# Patient Record
Sex: FEMALE | Race: WHITE | Employment: FULL TIME | URBAN - METROPOLITAN AREA
[De-identification: names, ages, dates, MRNs, and addresses within clinical notes are randomized per-mention and may not be internally consistent; named-entity substitution may affect disease eponyms.]

---

## 2017-01-04 ENCOUNTER — OFFICE VISIT (OUTPATIENT)
Dept: SURGERY | Age: 22
End: 2017-01-04

## 2017-01-04 DIAGNOSIS — K80.50 BILIARY COLIC: Primary | ICD-10-CM

## 2017-01-04 NOTE — PROGRESS NOTES
Progress Note    Patient: Josiah Black  MRN: [id number]  SSN: [id number]   YOB: 1995  Age: 22 y.o. Sex: female     No chief complaint on file. HPI    Ms. lopes is a most 2 weeks status post a open cholecystectomy. She had this because her liver was very large and would not allow visualization of the jessica. She has done well and is tolerating a regular diet moving her bowels without difficulty. Her drain is putting out around 30 cc of serous fluid and then went to go ahead and pull that today as it has been in a while. There is no bile in the drain. We will see her back in a couple weeks    Past Medical History   Diagnosis Date    Asthma      Past Surgical History   Procedure Laterality Date    Hx orthopaedic  7years old     186 stitches in arm (trauma), general anesthesia used    Pr removal gallbladder N/A 12/22/2016     Dr. Shelby Cheng     No Known Allergies  Current Outpatient Prescriptions   Medication Sig Dispense Refill    oxyCODONE-acetaminophen (PERCOCET 7.5) 7.5-325 mg per tablet Take 1 Tab by mouth every four (4) hours as needed for Pain. Max Daily Amount: 6 Tabs. 30 Tab 0    albuterol (PROVENTIL HFA, VENTOLIN HFA, PROAIR HFA) 90 mcg/actuation inhaler Take 2 Puffs by inhalation every four (4) hours as needed.  ondansetron (ZOFRAN ODT) 4 mg disintegrating tablet Take 1 Tab by mouth every eight (8) hours as needed for Nausea. 14 Tab 0     Social History     Social History    Marital status: SINGLE     Spouse name: N/A    Number of children: N/A    Years of education: N/A     Occupational History    Not on file. Social History Main Topics    Smoking status: Never Smoker    Smokeless tobacco: Never Used    Alcohol use No    Drug use: No    Sexual activity: Not on file     Other Topics Concern    Not on file     Social History Narrative     No family history on file.       Review of systems:  Patient denies any reflux, emesis, abdominal pain, change in bowel habits, hematochezia, melena, fever, weight loss, fatigue chills, dermatitis, abnormal moles, change in vision, vertigo, epistaxis, dysphagia, hoarseness, chest pain, palpitations, hypertension, edema, cough, shortness of breath, wheezing, hemoptysis, snoring, hematuria, diabetes, thyroid disease, anemia, bruising, history of blood transfusion, dizziness, headache, or fainting. Physical Examination    Well developed well nourished female in no apparent distress  Visit Vitals    LMP 11/14/2016      Head: normocephalic, atraumatic  Mouth: Clear, no overt lesions, oral mucosa pink and moist  Neck: supple, no masses, no adenopathy or carotid bruits, trachea midline  Resp: clear to auscultation bilaterally, no wheeze, rhonchi or rales, excursions normal and symmetrical  Cardio: Regular rate and rhythm, no murmurs, clicks, gallops or rubs, no edema or varicosities  Abdomen: soft, nontender, nondistended, normoactive bowel sounds, no hernias, no hepatosplenomegaly,   Back: Deferred  Extremeties: warm, well-perfused, no tenderness or swelling, normal gait/station  Neuro: sensation and strength grossly intact and symmetrical  Psych: alert and oriented to person, place and time  Breast exam deferred    IMPRESSION  Status post open cholecystectomy doing well and improving daily    PLAN  No orders of the defined types were placed in this encounter.     Follow-up in 2 weeks  Katja Napier MD

## 2017-01-04 NOTE — MR AVS SNAPSHOT
Visit Information Date & Time Provider Department Dept. Phone Encounter #  
 1/4/2017  3:15 PM Augustine Redd [facility] [phone] [id number] Your Appointments 1/18/2017  1:00 PM  
Follow Up with Chris Landa MD  
[address] Appt Note: f/u  
 [address] [facility] [address] [facility] [address]  
[phone]  
  
   
 [address] [facility] [address] [facility] [address] Upcoming Health Maintenance Date Due Hepatitis A Peds Age 1-18 (1 of 2 - Standard Series) 5/10/1997 DTaP/Tdap/Td series (1 - Tdap) 5/10/2003 HPV AGE 9Y-26Y (1 of 3 - Female 3 Dose Series) 5/10/2007 INFLUENZA AGE 9 TO ADULT 8/1/2016 Allergies as of 1/4/2017  Review Complete On: 1/4/2017 By: Chris Landa MD  
 No Known Allergies Current Immunizations  Never Reviewed No immunizations on file. Not reviewed this visit You Were Diagnosed With   
  
 Codes Comments Biliary colic    -  Primary LILY-09-XB: K80.50 ICD-9-CM: 574.20 Vitals LMP OB Status Smoking Status 11/14/2016 Having regular periods Never Smoker Your Updated Medication List  
  
   
This list is accurate as of: 1/4/17  3:48 PM.  Always use your most recent med list.  
  
  
  
  
 albuterol 90 mcg/actuation inhaler Commonly known as:  PROVENTIL HFA, VENTOLIN HFA, PROAIR HFA Take 2 Puffs by inhalation every four (4) hours as needed. ondansetron 4 mg disintegrating tablet Commonly known as:  ZOFRAN ODT Take 1 Tab by mouth every eight (8) hours as needed for Nausea. oxyCODONE-acetaminophen 7.5-325 mg per tablet Commonly known as:  PERCOCET 7.5 Take 1 Tab by mouth every four (4) hours as needed for Pain. Max Daily Amount: 6 Tabs. Please provide this summary of care documentation to your next provider. Your primary care clinician is listed as Prabhakar Abraham. If you have any questions after today's visit, please call [phone].

## 2017-01-18 ENCOUNTER — OFFICE VISIT (OUTPATIENT)
Dept: SURGERY | Age: 22
End: 2017-01-18

## 2017-01-18 VITALS — RESPIRATION RATE: 22 BRPM | HEIGHT: 64 IN | BODY MASS INDEX: 48.49 KG/M2 | HEART RATE: 88 BPM | WEIGHT: 284 LBS

## 2017-01-18 DIAGNOSIS — K80.50 BILIARY COLIC: Primary | ICD-10-CM

## 2017-01-18 NOTE — MR AVS SNAPSHOT
Visit Information Date & Time Provider Department Dept. Phone Encounter #  
 1/18/2017  1:00 PM Augustine Burns [facility] [id number] Upcoming Health Maintenance Date Due Hepatitis A Peds Age 1-18 (1 of 2 - Standard Series) 5/10/1997 DTaP/Tdap/Td series (1 - Tdap) 5/10/2003 HPV AGE 9Y-26Y (1 of 3 - Female 3 Dose Series) 5/10/2007 INFLUENZA AGE 9 TO ADULT 8/1/2016 Allergies as of 1/18/2017  Review Complete On: 1/18/2017 By: Angelica Rice LPN No Known Allergies Current Immunizations  Never Reviewed No immunizations on file. Not reviewed this visit You Were Diagnosed With   
  
 Codes Comments Biliary colic    -  Primary YMY-73-ZC: K80.50 ICD-9-CM: 574.20 Vitals Pulse Resp Height(growth percentile) Weight(growth percentile) LMP BMI  
 88 22 5' 4\" (1.626 m) 284 lb (128.8 kg) 11/14/2016 48.75 kg/m2 OB Status Smoking Status Having regular periods Never Smoker BMI and BSA Data Body Mass Index Body Surface Area 48.75 kg/m 2 2.41 m 2 Your Updated Medication List  
  
   
This list is accurate as of: 1/18/17  1:32 PM.  Always use your most recent med list.  
  
  
  
  
 albuterol 90 mcg/actuation inhaler Commonly known as:  PROVENTIL HFA, VENTOLIN HFA, PROAIR HFA Take 2 Puffs by inhalation every four (4) hours as needed. ondansetron 4 mg disintegrating tablet Commonly known as:  ZOFRAN ODT Take 1 Tab by mouth every eight (8) hours as needed for Nausea. oxyCODONE-acetaminophen 7.5-325 mg per tablet Commonly known as:  PERCOCET 7.5 Take 1 Tab by mouth every four (4) hours as needed for Pain. Max Daily Amount: 6 Tabs. Patient Instructions Call the office at [phone] if you have any questions or concerns. Please provide this summary of care documentation to your next provider. Your primary care clinician is listed as Prabhakar Cat. If you have any questions after today's visit, please call [phone].

## 2017-01-18 NOTE — PROGRESS NOTES
Progress Note    Patient: Samina Chris  MRN: [id number]  SSN: [id number]   YOB: 1995  Age: 22 y.o. Sex: female     Chief Complaint   Patient presents with    Post OP Follow Up       HPI    Ms. Agnes Ryan returns one last time to do a wound check and see how she is doing. She has no complaints, is tolerating a regular diet and moving her bowels normally. Her wound looks good, and she has no complaints we will see her as needed from now on. Past Medical History   Diagnosis Date    Asthma      Past Surgical History   Procedure Laterality Date    Hx orthopaedic  7years old     80 stitches in arm (trauma), general anesthesia used    Pr removal gallbladder N/A 12/22/2016     Dr. Howie Oconnell     No Known Allergies  Current Outpatient Prescriptions   Medication Sig Dispense Refill    oxyCODONE-acetaminophen (PERCOCET 7.5) 7.5-325 mg per tablet Take 1 Tab by mouth every four (4) hours as needed for Pain. Max Daily Amount: 6 Tabs. 30 Tab 0    albuterol (PROVENTIL HFA, VENTOLIN HFA, PROAIR HFA) 90 mcg/actuation inhaler Take 2 Puffs by inhalation every four (4) hours as needed.  ondansetron (ZOFRAN ODT) 4 mg disintegrating tablet Take 1 Tab by mouth every eight (8) hours as needed for Nausea. 14 Tab 0     Social History     Social History    Marital status: SINGLE     Spouse name: N/A    Number of children: N/A    Years of education: N/A     Occupational History    Not on file. Social History Main Topics    Smoking status: Never Smoker    Smokeless tobacco: Never Used    Alcohol use No    Drug use: No    Sexual activity: Not on file     Other Topics Concern    Not on file     Social History Narrative     No family history on file.       Review of systems:  Patient denies any reflux, emesis, abdominal pain, change in bowel habits, hematochezia, melena, fever, weight loss, fatigue chills, dermatitis, abnormal moles, change in vision, vertigo, epistaxis, dysphagia, hoarseness, chest pain, palpitations, hypertension, edema, cough, shortness of breath, wheezing, hemoptysis, snoring, hematuria, diabetes, thyroid disease, anemia, bruising, history of blood transfusion, dizziness, headache, or fainting. Physical Examination    Well developed well nourished female in no apparent distress  Visit Vitals    Pulse 88    Resp 22    Ht 5' 4\" (1.626 m)    Wt 128.8 kg (284 lb)    LMP 11/14/2016    BMI 48.75 kg/m2      Head: normocephalic, atraumatic  Mouth: Clear, no overt lesions, oral mucosa pink and moist  Neck: supple, no masses, no adenopathy or carotid bruits, trachea midline  Resp: clear to auscultation bilaterally, no wheeze, rhonchi or rales, excursions normal and symmetrical  Cardio: Regular rate and rhythm, no murmurs, clicks, gallops or rubs, no edema or varicosities  Abdomen: soft, nontender, nondistended, normoactive bowel sounds, no hernias, no hepatosplenomegaly, healed laparotomy wound  Back: Deferred  Extremeties: warm, well-perfused, no tenderness or swelling, normal gait/station  Neuro: sensation and strength grossly intact and symmetrical  Psych: alert and oriented to person, place and time  Breast exam deferred    IMPRESSION  Doing well after open cholecystectomy    PLAN  No orders of the defined types were placed in this encounter.     Follow-up as needed  Jean Paul Butcher MD

## 2017-11-07 ENCOUNTER — HOSPITAL ENCOUNTER (EMERGENCY)
Age: 22
Discharge: HOME | End: 2017-11-07
Attending: EMERGENCY MEDICINE
Payer: COMMERCIAL

## 2017-11-07 VITALS
WEIGHT: 287.4 LBS | OXYGEN SATURATION: 100 % | HEART RATE: 98 BPM | DIASTOLIC BLOOD PRESSURE: 77 MMHG | TEMPERATURE: 98.6 F | RESPIRATION RATE: 20 BRPM | BODY MASS INDEX: 49.06 KG/M2 | SYSTOLIC BLOOD PRESSURE: 130 MMHG | HEIGHT: 64 IN

## 2017-11-07 DIAGNOSIS — N39.0 URINARY TRACT INFECTION WITH HEMATURIA, SITE UNSPECIFIED: Primary | ICD-10-CM

## 2017-11-07 DIAGNOSIS — N93.9 ABNORMAL UTERINE BLEEDING: ICD-10-CM

## 2017-11-07 DIAGNOSIS — R31.9 URINARY TRACT INFECTION WITH HEMATURIA, SITE UNSPECIFIED: Primary | ICD-10-CM

## 2017-11-07 LAB
ALBUMIN SERPL-MCNC: 3.7 G/DL (ref 3.4–5)
ALBUMIN/GLOB SERPL: 1 {RATIO} (ref 0.8–1.7)
ALP SERPL-CCNC: 86 U/L (ref 45–117)
ALT SERPL-CCNC: 49 U/L (ref 13–56)
ANION GAP SERPL CALC-SCNC: 8 MMOL/L (ref 3–18)
APPEARANCE UR: ABNORMAL
AST SERPL-CCNC: 34 U/L (ref 15–37)
BACTERIA URNS QL MICRO: ABNORMAL /HPF
BASOPHILS # BLD: 0 K/UL (ref 0–0.1)
BASOPHILS NFR BLD: 0 % (ref 0–2)
BILIRUB SERPL-MCNC: 0.3 MG/DL (ref 0.2–1)
BILIRUB UR QL: ABNORMAL
BUN SERPL-MCNC: 11 MG/DL (ref 7–18)
BUN/CREAT SERPL: 17 (ref 12–20)
CALCIUM SERPL-MCNC: 8.6 MG/DL (ref 8.5–10.1)
CHLORIDE SERPL-SCNC: 105 MMOL/L (ref 100–108)
CO2 SERPL-SCNC: 27 MMOL/L (ref 21–32)
COLOR UR: ABNORMAL
CREAT SERPL-MCNC: 0.65 MG/DL (ref 0.6–1.3)
DIFFERENTIAL METHOD BLD: ABNORMAL
EOSINOPHIL # BLD: 0.2 K/UL (ref 0–0.4)
EOSINOPHIL NFR BLD: 2 % (ref 0–5)
EPITH CASTS URNS QL MICRO: ABNORMAL /LPF (ref 0–5)
ERYTHROCYTE [DISTWIDTH] IN BLOOD BY AUTOMATED COUNT: 14.2 % (ref 11.6–14.5)
GLOBULIN SER CALC-MCNC: 3.7 G/DL (ref 2–4)
GLUCOSE SERPL-MCNC: 109 MG/DL (ref 74–99)
GLUCOSE UR STRIP.AUTO-MCNC: NEGATIVE MG/DL
HCG UR QL: NEGATIVE
HCT VFR BLD AUTO: 36.1 % (ref 35–45)
HGB BLD-MCNC: 11.4 G/DL (ref 12–16)
HGB UR QL STRIP: ABNORMAL
KETONES UR QL STRIP.AUTO: NEGATIVE MG/DL
LEUKOCYTE ESTERASE UR QL STRIP.AUTO: ABNORMAL
LYMPHOCYTES # BLD: 2.8 K/UL (ref 0.9–3.6)
LYMPHOCYTES NFR BLD: 37 % (ref 21–52)
MCH RBC QN AUTO: 26.2 PG (ref 24–34)
MCHC RBC AUTO-ENTMCNC: 31.6 G/DL (ref 31–37)
MCV RBC AUTO: 83 FL (ref 74–97)
MONOCYTES # BLD: 0.5 K/UL (ref 0.05–1.2)
MONOCYTES NFR BLD: 7 % (ref 3–10)
NEUTS SEG # BLD: 4 K/UL (ref 1.8–8)
NEUTS SEG NFR BLD: 54 % (ref 40–73)
NITRITE UR QL STRIP.AUTO: POSITIVE
PH UR STRIP: 6.5 [PH] (ref 5–8)
PLATELET # BLD AUTO: 249 K/UL (ref 135–420)
PMV BLD AUTO: 12 FL (ref 9.2–11.8)
POTASSIUM SERPL-SCNC: 4.4 MMOL/L (ref 3.5–5.5)
PROT SERPL-MCNC: 7.4 G/DL (ref 6.4–8.2)
PROT UR STRIP-MCNC: 100 MG/DL
RBC # BLD AUTO: 4.35 M/UL (ref 4.2–5.3)
RBC #/AREA URNS HPF: ABNORMAL /HPF (ref 0–5)
SODIUM SERPL-SCNC: 140 MMOL/L (ref 136–145)
SP GR UR REFRACTOMETRY: 1.02 (ref 1–1.03)
UROBILINOGEN UR QL STRIP.AUTO: 0.2 EU/DL (ref 0.2–1)
WBC # BLD AUTO: 7.4 K/UL (ref 4.6–13.2)
WBC URNS QL MICRO: ABNORMAL /HPF (ref 0–4)

## 2017-11-07 PROCEDURE — 87086 URINE CULTURE/COLONY COUNT: CPT | Performed by: PHYSICIAN ASSISTANT

## 2017-11-07 PROCEDURE — 81025 URINE PREGNANCY TEST: CPT | Performed by: PHYSICIAN ASSISTANT

## 2017-11-07 PROCEDURE — 99284 EMERGENCY DEPT VISIT MOD MDM: CPT

## 2017-11-07 PROCEDURE — 81001 URINALYSIS AUTO W/SCOPE: CPT | Performed by: PHYSICIAN ASSISTANT

## 2017-11-07 PROCEDURE — 80053 COMPREHEN METABOLIC PANEL: CPT | Performed by: PHYSICIAN ASSISTANT

## 2017-11-07 PROCEDURE — 85025 COMPLETE CBC W/AUTO DIFF WBC: CPT | Performed by: PHYSICIAN ASSISTANT

## 2017-11-07 RX ORDER — SULFAMETHOXAZOLE AND TRIMETHOPRIM 800; 160 MG/1; MG/1
1 TABLET ORAL 2 TIMES DAILY
Qty: 14 TAB | Refills: 0 | Status: SHIPPED | OUTPATIENT
Start: 2017-11-07 | End: 2017-11-14

## 2017-11-07 NOTE — ED PROVIDER NOTES
HPI Comments: Pt is a 26 yo female with PMH of irregular menses and asthma presents to the ED for heavy vaginal bleeding and cramping. Pt reports she did not have menses last month, began it this month on 11/2/17. She has been having much heavier bleeding and PCP told her to come to ED. She notes associated pelvic cramping, but that has been happening for 3-4 weeks. She also notes increased urinary frequency. Pt denies fever, chills, HA, dizziness, lightheadedness, CP, SOB, cough, abd pain, back pain, numbness, weakness, N/V/D/C, dysuria, or any other complaints. Patient is a 25 y.o. female presenting with vaginal bleeding. The history is provided by the patient. Vaginal Bleeding   Pertinent negatives include no chest pain, no abdominal pain, no headaches and no shortness of breath. Nothing relieves the symptoms. She has tried nothing for the symptoms. The treatment provided no relief. Past Medical History:   Diagnosis Date    Asthma        Past Surgical History:   Procedure Laterality Date    HX ORTHOPAEDIC  7years old    80 stitches in arm (trauma), general anesthesia used    REMOVAL GALLBLADDER N/A 12/22/2016    Dr. Howie Oconnell         No family history on file. Social History     Social History    Marital status: SINGLE     Spouse name: N/A    Number of children: N/A    Years of education: N/A     Occupational History    Not on file. Social History Main Topics    Smoking status: Never Smoker    Smokeless tobacco: Never Used    Alcohol use No    Drug use: No    Sexual activity: Not on file     Other Topics Concern    Not on file     Social History Narrative         ALLERGIES: Review of patient's allergies indicates no known allergies. Review of Systems   Constitutional: Negative for chills and fever. HENT: Negative for ear pain, rhinorrhea and sore throat. Eyes: Negative for pain and redness. Respiratory: Negative for cough and shortness of breath.     Cardiovascular: Negative for chest pain. Gastrointestinal: Negative for abdominal pain, anal bleeding, blood in stool, constipation, diarrhea, nausea, rectal pain and vomiting. Genitourinary: Positive for frequency, menstrual problem, pelvic pain and vaginal bleeding. Negative for dysuria, vaginal discharge and vaginal pain. Musculoskeletal: Negative for back pain and gait problem. Skin: Negative. Neurological: Negative for dizziness, weakness, light-headedness and headaches. Hematological: Does not bruise/bleed easily. Psychiatric/Behavioral: Negative. Vitals:    11/07/17 1327   BP: 130/77   Pulse: 98   Resp: 20   Temp: 98.6 °F (37 °C)   SpO2: 100%   Weight: 130.4 kg (287 lb 6.4 oz)   Height: 5' 4\" (1.626 m)            Physical Exam   Constitutional: She is oriented to person, place, and time. She appears well-developed and well-nourished. No distress. Obese   HENT:   Head: Normocephalic and atraumatic. Right Ear: Tympanic membrane, external ear and ear canal normal.   Left Ear: Tympanic membrane, external ear and ear canal normal.   Nose: Nose normal.   Mouth/Throat: Oropharynx is clear and moist and mucous membranes are normal.   Eyes: Conjunctivae and EOM are normal. Pupils are equal, round, and reactive to light. Neck: Normal range of motion. Neck supple. Cardiovascular: Normal rate, regular rhythm, normal heart sounds and intact distal pulses. Exam reveals no gallop and no friction rub. No murmur heard. Pulmonary/Chest: Effort normal and breath sounds normal. No respiratory distress. She has no wheezes. She has no rales. Abdominal: Soft. Bowel sounds are normal. She exhibits no distension. There is no tenderness. There is no rigidity, no rebound, no guarding, no CVA tenderness, no tenderness at McBurney's point and negative Amato's sign. Genitourinary: No vaginal discharge found. Genitourinary Comments: Vaginal bleeding noted in vault. Musculoskeletal: Normal range of motion. Neurological: She is alert and oriented to person, place, and time. Skin: Skin is warm and dry. She is not diaphoretic. Psychiatric: She has a normal mood and affect. Her behavior is normal. Judgment and thought content normal.   Nursing note and vitals reviewed. MDM  Number of Diagnoses or Management Options  Urinary tract infection with hematuria, site unspecified:   Diagnosis management comments: DDx: preg, ectopic, miscarriage, menstruation, ovarian cyst, ovarian torsion, tubo-ovarian abscess, fibroids, UTI, pyelo, kidney stones, STD,  Cdwi-Qmfj-Dqfdor syndrome, hemorroids, dehydration,anemia, malignancy    Briefed PILAR Barry about the patient. He/She will follow any all lab reports and imaging studies. He/She will monitor the patient closely and dispo accordingly. Anupama Albarran PA-C     6:30   Assumed care of patient from [facility]. Will examine patient, perform pelvic exam and review all labs/work up done thus far. Gayatri Sarmiento PA-C     Pt denies being sexually active, or hx of. Reports that she has seen an obgyn in past but not currently. Pelvic exam with moderate bleeding, NAP. No indication for specimens. Will check cbc and cmp. Ua for infection. Plan to d/c home with obgyn f/u      7:53 PM  UA consistent with UTI. Will treat with short course of abx and culture urine. Labs otherwise WNL. H&H stable. Normal WBC, lytes.      obgyn f/u and return sx discussed       Amount and/or Complexity of Data Reviewed  Clinical lab tests: reviewed and ordered  Review and summarize past medical records: yes  Discuss the patient with other providers: yes  Independent visualization of images, tracings, or specimens: yes    Risk of Complications, Morbidity, and/or Mortality  Presenting problems: moderate  Diagnostic procedures: moderate  Management options: moderate    Patient Progress  Patient progress: stable    ED Course       Pelvic Exam  Date/Time: 11/7/2017 6:34 PM  Performed by: PA  Procedure duration:  10 minutes. Documented by:  self. As dictated by:  self  Exam assisted by:  ED nurse. Type of exam performed: speculum. External genitalia appearance: normal.    Vaginal exam:  normal and bleeding. Bleeding: moderate  Cervical exam:  normal and inadequately visualized. Specimen(s) collected:  none. Bimanual exam:  normal.    Patient tolerance: Patient tolerated the procedure well with no immediate complications          Labs Reviewed   CBC WITH AUTOMATED DIFF - Abnormal; Notable for the following:        Result Value    HGB 11.4 (*)     MPV 12.0 (*)     All other components within normal limits   WET PREP   URINALYSIS W/ RFLX MICROSCOPIC   HCG URINE, QL   CHLAMYDIA/NEISSERIA AMPLIFICATION   METABOLIC PANEL, COMPREHENSIVE     Diagnosis: No diagnosis found. Disposition: Home    Follow-up Information     None          Patient's Medications   Start Taking    No medications on file   Continue Taking    ALBUTEROL (PROVENTIL HFA, VENTOLIN HFA, PROAIR HFA) 90 MCG/ACTUATION INHALER    Take 2 Puffs by inhalation every four (4) hours as needed. ONDANSETRON (ZOFRAN ODT) 4 MG DISINTEGRATING TABLET    Take 1 Tab by mouth every eight (8) hours as needed for Nausea. OXYCODONE-ACETAMINOPHEN (PERCOCET 7.5) 7.5-325 MG PER TABLET    Take 1 Tab by mouth every four (4) hours as needed for Pain. Max Daily Amount: 6 Tabs.    These Medications have changed    No medications on file   Stop Taking    No medications on file

## 2017-11-07 NOTE — LETTER
[facility] EMERGENCY DEPT 
[address] [facility] [address] 
[phone] Work/School Note Date: 11/7/2017 To Whom It May concern: 
 
Frandy Gamez was seen and treated today in the emergency room by the following provider(s): 
Attending Provider: Agustin Lewis DO Physician Assistant: Madhuri Simpson PA-C. Frandy Gamez return to work 11/9/17 Sincerely, Madhuri Simpson PA-C

## 2017-11-08 LAB
BACTERIA SPEC CULT: NORMAL
SERVICE CMNT-IMP: NORMAL

## 2017-11-08 NOTE — DISCHARGE INSTRUCTIONS
Blood in the Urine: Care Instructions  Your Care Instructions    Blood in the urine, or hematuria, may make the urine look red, brown, or pink. There may be blood every time you urinate or just from time to time. You cannot always see blood in the urine, but it will show up in a urine test.  Blood in the urine may be serious. It should always be checked by a doctor. Your doctor may recommend more tests, including an X-ray, a CT scan, or a cystoscopy (which lets a doctor look inside the urethra and bladder). Blood in the urine can be a sign of another problem. Common causes are bladder infections and kidney stones. An injury to your groin or your genital area can also cause bleeding in the urinary tract. Very hard exercise-such as running a marathon-can cause blood in the urine. Blood in the urine can also be a sign of kidney disease or cancer in the bladder or kidney. Many cases of blood in the urine are caused by a harmless condition that runs in families. This is called benign familial hematuria. It does not need any treatment. Sometimes your urine may look red or brown even though it does not contain blood. For example, not getting enough fluids (dehydration), taking certain medicines, or having a liver problem can change the color of your urine. Eating foods such as beets, rhubarb, or blackberries or foods with red food coloring can make your urine look red or pink. Follow-up care is a key part of your treatment and safety. Be sure to make and go to all appointments, and call your doctor if you are having problems. It's also a good idea to know your test results and keep a list of the medicines you take. When should you call for help? Call your doctor now or seek immediate medical care if:  · You have symptoms of a urinary infection. For example:  ¨ You have pus in your urine. ¨ You have pain in your back just below your rib cage. This is called flank pain.   ¨ You have a fever, chills, or body aches.  ¨ It hurts to urinate. ¨ You have groin or belly pain. · You have more blood in your urine. Watch closely for changes in your health, and be sure to contact your doctor if:  · You have new urination problems. · You do not get better as expected. Where can you learn more? Go to http://eder-tho.info/. Enter Z169 in the search box to learn more about \"Blood in the Urine: Care Instructions. \"  Current as of: May 12, 2017  Content Version: 11.4  © 1973-3946 [employer]. Care instructions adapted under license by [employer] (which disclaims liability or warranty for this information). If you have questions about a medical condition or this instruction, always ask your healthcare professional. [place] [address] any warranty or liability for your use of this information. Urinary Tract Infection in Women: Care Instructions  Your Care Instructions    A urinary tract infection, or UTI, is a general term for an infection anywhere between the kidneys and the urethra (where urine comes out). Most UTIs are bladder infections. They often cause pain or burning when you urinate. UTIs are caused by bacteria and can be cured with antibiotics. Be sure to complete your treatment so that the infection goes away. Follow-up care is a key part of your treatment and safety. Be sure to make and go to all appointments, and call your doctor if you are having problems. It's also a good idea to know your test results and keep a list of the medicines you take. How can you care for yourself at home? · Take your antibiotics as directed. Do not stop taking them just because you feel better. You need to take the full course of antibiotics. · Drink extra water and other fluids for the next day or two. This may help wash out the bacteria that are causing the infection.  (If you have kidney, heart, or liver disease and have to limit fluids, talk with your doctor before you increase your fluid intake.)  · Avoid drinks that are carbonated or have caffeine. They can irritate the bladder. · Urinate often. Try to empty your bladder each time. · To relieve pain, take a hot bath or lay a heating pad set on low over your lower belly or genital area. Never go to sleep with a heating pad in place. To prevent UTIs  · Drink plenty of water each day. This helps you urinate often, which clears bacteria from your system. (If you have kidney, heart, or liver disease and have to limit fluids, talk with your doctor before you increase your fluid intake.)  · Urinate when you need to. · Urinate right after you have sex. · Change sanitary pads often. · Avoid douches, bubble baths, feminine hygiene sprays, and other feminine hygiene products that have deodorants. · After going to the bathroom, wipe from front to back. When should you call for help? Call your doctor now or seek immediate medical care if:  ? · Symptoms such as fever, chills, nausea, or vomiting get worse or appear for the first time. ? · You have new pain in your back just below your rib cage. This is called flank pain. ? · There is new blood or pus in your urine. ? · You have any problems with your antibiotic medicine. ? Watch closely for changes in your health, and be sure to contact your doctor if:  ? · You are not getting better after taking an antibiotic for 2 days. ? · Your symptoms go away but then come back. Where can you learn more? Go to http://eder-tho.info/. Enter J623 in the search box to learn more about \"Urinary Tract Infection in Women: Care Instructions. \"  Current as of: May 12, 2017  Content Version: 11.4  © 5839-1645 [employer]. Care instructions adapted under license by [employer] (which disclaims liability or warranty for this information).  If you have questions about a medical condition or this instruction, always ask your healthcare professional. [address] any warranty or liability for your use of this information. Abnormal Uterine Bleeding: Care Instructions  Your Care Instructions    Abnormal uterine bleeding (AUB) is irregular bleeding from the uterus that is longer or heavier than usual or does not occur at your regular time. Sometimes it is caused by changes in hormone levels. It can also be caused by growths in the uterus, such as fibroids or polyps. Sometimes a cause cannot be found. You may have heavy bleeding when you are not expecting your period. Your doctor may suggest a pregnancy test, if you think you are pregnant. Follow-up care is a key part of your treatment and safety. Be sure to make and go to all appointments, and call your doctor if you are having problems. It's also a good idea to know your test results and keep a list of the medicines you take. How can you care for yourself at home? · Be safe with medicines. Take pain medicines exactly as directed. ¨ If the doctor gave you a prescription medicine for pain, take it as prescribed. ¨ If you are not taking a prescription pain medicine, ask your doctor if you can take an over-the-counter medicine. · You may be low in iron because of blood loss. Eat a balanced diet that is high in iron and vitamin C. Foods rich in iron include red meat, shellfish, eggs, beans, and leafy green vegetables. Talk to your doctor about whether you need to take iron pills or a multivitamin. When should you call for help? Call 911 anytime you think you may need emergency care. For example, call if:  ? · You passed out (lost consciousness). ?Call your doctor now or seek immediate medical care if:  ? · You have new or worse belly or pelvic pain. ? · You have severe vaginal bleeding. ? · You feel dizzy or lightheaded, or you feel like you may faint. ? Watch closely for changes in your health, and be sure to contact your doctor if:  ? · You think you may be pregnant. ? · Your bleeding gets worse. ? · You do not get better as expected. Where can you learn more? Go to http://eder-tho.info/. Enter D442 in the search box to learn more about \"Abnormal Uterine Bleeding: Care Instructions. \"  Current as of: October 13, 2016  Content Version: 11.4  © 1913-7632 [employer]. Care instructions adapted under license by [employer] (which disclaims liability or warranty for this information). If you have questions about a medical condition or this instruction, always ask your healthcare professional. [place] [address] any warranty or liability for your use of this information.

## 2017-12-04 ENCOUNTER — HOSPITAL ENCOUNTER (OUTPATIENT)
Dept: LAB | Age: 22
Discharge: HOME | End: 2017-12-04
Payer: COMMERCIAL

## 2017-12-04 ENCOUNTER — OFFICE VISIT (OUTPATIENT)
Dept: OBGYN CLINIC | Age: 22
End: 2017-12-04

## 2017-12-04 VITALS
DIASTOLIC BLOOD PRESSURE: 68 MMHG | HEIGHT: 64 IN | WEIGHT: 286 LBS | SYSTOLIC BLOOD PRESSURE: 147 MMHG | BODY MASS INDEX: 48.83 KG/M2 | HEART RATE: 81 BPM

## 2017-12-04 DIAGNOSIS — E66.01 OBESITY, MORBID, BMI 40.0-49.9 (HCC): ICD-10-CM

## 2017-12-04 DIAGNOSIS — N92.1 MENORRHAGIA WITH IRREGULAR CYCLE: ICD-10-CM

## 2017-12-04 DIAGNOSIS — N92.6 IRREGULAR MENSES: Primary | ICD-10-CM

## 2017-12-04 DIAGNOSIS — N94.6 DYSMENORRHEA: ICD-10-CM

## 2017-12-04 PROCEDURE — 87798 DETECT AGENT NOS DNA AMP: CPT | Performed by: OBSTETRICS & GYNECOLOGY

## 2017-12-04 PROCEDURE — 84146 ASSAY OF PROLACTIN: CPT | Performed by: OBSTETRICS & GYNECOLOGY

## 2017-12-04 PROCEDURE — 84702 CHORIONIC GONADOTROPIN TEST: CPT | Performed by: OBSTETRICS & GYNECOLOGY

## 2017-12-04 PROCEDURE — 84443 ASSAY THYROID STIM HORMONE: CPT | Performed by: OBSTETRICS & GYNECOLOGY

## 2017-12-04 PROCEDURE — 84270 ASSAY OF SEX HORMONE GLOBUL: CPT | Performed by: OBSTETRICS & GYNECOLOGY

## 2017-12-04 PROCEDURE — 83498 ASY HYDROXYPROGESTERONE 17-D: CPT | Performed by: OBSTETRICS & GYNECOLOGY

## 2017-12-04 NOTE — PROGRESS NOTES
Name: Josiah Black MRN: [id number]    YOB: 1995  Age: 25 y.o. Sex: female        Chief Complaint   Patient presents with    Irregular Menses       HPI     1. Irregular menses--> Notes that she has never had normal periods, 1st saw Dr. Jaymie Dalal, did blood work, saw her several years ago, tried to do an [address] but pt was unable to tolerate it. Sometimes skips months, has skipped 1 month at a time, sometimes has very heavy periods, has lasted as long as 8 weeks before, went to the ED approx 3 weeks ago because she was bleeding so heavy, bleeding over a pad an hour at that time, not bleeding now. Was also dx'ed with a UTI, was given antibiotics, her bleeding stopped at that time    2. BMI 49    OB History      Para Term  AB Living    0 0 0 0 0 0    SAB TAB Ectopic Molar Multiple Live Births    0 0 0 0 0 0        Obstetric Comments        Periods irregular, last 7-weeks days, flow heavy, moderate dysmenorrhea  History of sexually transmitted infections never  Last pap never             PGyn  History   Sexual Activity    Sexual activity: Not on file     Comment: never has been sexually active         Past Medical History:   Diagnosis Date    Asthma     Obesity, Class III, BMI 40-49.9 (morbid obesity) ([facility] [address]        Past Surgical History:   Procedure Laterality Date    HX ORTHOPAEDIC  7years old    186 stitches in arm (trauma), general anesthesia used    REMOVAL GALLBLADDER N/A 2016    Dr. Shelby Cheng       No Known Allergies    Current Outpatient Prescriptions on File Prior to Visit   Medication Sig Dispense Refill    albuterol (PROVENTIL HFA, VENTOLIN HFA, PROAIR HFA) 90 mcg/actuation inhaler Take 2 Puffs by inhalation every four (4) hours as needed. No current facility-administered medications on file prior to visit.         Social History     Social History    Marital status: SINGLE     Spouse name: N/A    Number of children: N/A    Years of education: N/A     Occupational History    Not on file. Social History Main Topics    Smoking status: Never Smoker    Smokeless tobacco: Never Used    Alcohol use 0.6 oz/week     1 Glasses of wine per week      Comment: 5-6 times per year    Drug use: No    Sexual activity: Not on file      Comment: never has been sexually active     Other Topics Concern    Not on file     Social History Narrative       Family History   Problem Relation Age of Onset    Arthritis-osteo Mother      throughout her spine    Glaucoma Maternal Grandmother     COPD Maternal Grandmother     Kidney Disease Maternal Grandfather     Heart Disease Maternal Grandfather     Hypertension Paternal Grandmother     Hypertension Paternal Grandfather     Diabetes Paternal Grandfather     Cancer Maternal Aunt      thyroid    Other Maternal Aunt      \"tumors in uterus that weighed 50 lbs\"    Breast Cancer Maternal Aunt        Review of Systems   Constitutional: Negative. HENT: Negative. Respiratory: Negative. Cardiovascular: Negative. Gastrointestinal: Negative. Genitourinary: Negative. Musculoskeletal: Negative. Skin: Negative. Neurological: Negative. Psychiatric/Behavioral: Negative. Visit Vitals    /68 (BP 1 Location: Right arm, BP Patient Position: Sitting)    Pulse 81    Ht 5' 4\" (1.626 m)    Wt 286 lb (129.7 kg)    BMI 49.09 kg/m2       GENERAL:  Well developed, well nourished, in no distress  NEURO/PSYCHE: Grossly intact, normal mood and affect  HEENT: Normal cephalic, atraumatic, good dentition, neck supple.  No thyromegaly  CV: regular rate and rhythm  LUNGS: clear to auscultation bilaterally, no wheezes, rhonchi or rales, good air entry with normal effort  ABDOMEN: + BS, soft without tenderness, no guarding, rebound or masses, exam limited by pt's body habitus  EXTREMITIES: no edema or erythema noted  SKIN:  Warm, dry, no lesions  LYMPHATICS: No supraclavicular or inguinal nodes noted    PELVIC EXAM:  LABIA MAJORA: no masses, tenderness or lesions  LABIA MINORA: no masses, tenderness or lesions  CLITORIS: no masses, tenderness or lesions  URETHRA: normal appearing, no masses or tenderness  BLADDER: no fullness or tenderness  VAGINA: pink appearing vagina with physiologic discharge, no lesions   PERINEUM: no masses, tenderness or lesions  CERVIX: No CMT or lesions  UTERUS: small, mobile, nontender, exam limited by pt's body habitus  ADNEXA: nontender and no masses, exam limited by pt's body habitus    No results found for this or any previous visit (from the past 24 hour(s)). 1. Irregular menses  Reviewed possible dx with pt and discussed probable dx of PCOS, all of her questions were answered, she does snore, may need work for sleep apnea    - 17-OH PROGESTERONE LCMS; Future  - BETA HCG, QT; Future  - FREE ANDROGEN INDEX (JUDY); Future  - HEMOGLOBIN A1C WITH EAG; Future  - PROLACTIN; Future  - TSH 3RD GENERATION; Future  - US PELV NON OBS; Future    2. Obesity, morbid, BMI 40.0-49.9 ([facility] [address]  Working on losing weight, has lost 10-15, cutting down what she eats  - 17-OH PROGESTERONE LCMS; Future  - BETA HCG, QT; Future  - FREE ANDROGEN INDEX (JUDY); Future  - HEMOGLOBIN A1C WITH EAG; Future  - PROLACTIN; Future  - TSH 3RD GENERATION; Future  - US PELV NON OBS; Future    3. Menorrhagia with irregular cycle  Seems to happen after she skips her periods, reviewed NSAID therapy    4.  Dysmenorrhea  Try NSAID therapy    F/U 4 weeks

## 2017-12-04 NOTE — PATIENT INSTRUCTIONS
Polycystic Ovary Syndrome: Care Instructions  Your Care Instructions  Polycystic ovary syndrome, or PCOS, means a woman's hormones are out of balance. It can cause problems with your periods and make it hard to get pregnant. Doctors don't know for sure what causes PCOS, but it seems to run in families. It also seems to be linked to obesity and a risk for diabetes. If you have PCOS, your sisters and daughters have a higher chance of getting it too. You may have other symptoms. These include weight gain, acne, too much hair growth on the face or body, high blood pressure, and high blood sugar. Your ovaries may have cysts on them. These cysts are growths filled with fluid. Keep in mind that although you may not have regular periods, you can still get pregnant. Talk to your doctor about birth control if you do not want to get pregnant. Sometimes the hormone changes with PCOS can also make it hard for some women to get pregnant. If this is a concern, talk to your doctor about treatment for this problem. Women who have PCOS can go for months or longer with no period. Your doctor may recommend medicines that can help get your cycles back to normal.  Follow-up care is a key part of your treatment and safety. Be sure to make and go to all appointments, and call your doctor if you are having problems. It's also a good idea to know your test results and keep a list of the medicines you take. How can you care for yourself at home? · Take your medicines exactly as prescribed. Call your doctor if you think you are having a problem with your medicine. · Eat a healthy diet. Include fruits, vegetables, beans, and whole grains in your diet each day. · If you are overweight, losing weight can help with many of the symptoms of PCOS. Talk to your doctor about safe ways to lose weight. · Get at least 30 minutes of exercise on most days of the week. Walking is a good choice.  Or you can run, swim, cycle, or play tennis or team sports. · For hair growth you don't want, try bleaching, plucking, electrolysis, or laser therapy. · Acne can be treated with over-the-counter medicines. Look for ones that have benzoyl peroxide or salicylic acid in them. When should you call for help? Call your doctor now or seek immediate medical care if:  ? · You have severe vaginal bleeding. ? · You have new or worse belly or pelvic pain. ? Watch closely for changes in your health, and be sure to contact your doctor if:  ? · You do not get better as expected. ? · You have unusual vaginal bleeding. Where can you learn more? Go to http://eder-tho.info/. Enter F180 in the search box to learn more about \"Polycystic Ovary Syndrome: Care Instructions. \"  Current as of: October 13, 2016  Content Version: 11.4  © 2156-3292 [employer]. Care instructions adapted under license by [employer] (which disclaims liability or warranty for this information). If you have questions about a medical condition or this instruction, always ask your healthcare professional. [address] any warranty or liability for your use of this information.

## 2017-12-05 LAB
HCG SERPL-ACNC: <1 MIU/ML (ref 0–10)
TSH SERPL DL<=0.05 MIU/L-ACNC: 1.45 UIU/ML (ref 0.36–3.74)

## 2017-12-06 LAB
ANDROGEN FAI SERPL-SRTO: 7.5 % (ref 0.4–8.4)
SHBG SERPL-SCNC: 19 NMOL/L (ref 24.6–122)
TESTOST SERPL-MCNC: 41 NG/DL (ref 8–48)

## 2017-12-07 LAB — 17OHP SERPL-MCNC: 48 NG/DL

## 2017-12-08 LAB
A VAGINAE DNA VAG QL NAA+PROBE: ABNORMAL SCORE
BVAB2 DNA VAG QL NAA+PROBE: ABNORMAL SCORE
C ALBICANS DNA VAG QL NAA+PROBE: NEGATIVE
C GLABRATA DNA VAG QL NAA+PROBE: POSITIVE
C TRACH RRNA SPEC QL NAA+PROBE: NEGATIVE
MEGA1 DNA VAG QL NAA+PROBE: ABNORMAL SCORE
N GONORRHOEA RRNA SPEC QL NAA+PROBE: NEGATIVE
PROLACTIN SERPL-MCNC: 10.3 NG/ML
SPECIMEN SOURCE: ABNORMAL
T VAGINALIS RRNA SPEC QL NAA+PROBE: NEGATIVE

## 2017-12-12 ENCOUNTER — HOSPITAL ENCOUNTER (OUTPATIENT)
Dept: ULTRASOUND IMAGING | Age: 22
Discharge: HOME | End: 2017-12-12
Attending: OBSTETRICS & GYNECOLOGY
Payer: COMMERCIAL

## 2017-12-12 DIAGNOSIS — E66.01 OBESITY, MORBID, BMI 40.0-49.9 (HCC): ICD-10-CM

## 2017-12-12 DIAGNOSIS — N92.6 IRREGULAR MENSES: ICD-10-CM

## 2017-12-12 PROCEDURE — 76856 US EXAM PELVIC COMPLETE: CPT

## 2018-01-09 ENCOUNTER — TELEPHONE (OUTPATIENT)
Dept: OBGYN CLINIC | Age: 23
End: 2018-01-09

## 2018-01-11 NOTE — TELEPHONE ENCOUNTER
Call made to the pt, pt in school, spoke with mother Mrs. Jenny Grey. Noted pt's call with concerns for US results. Mrs.K. Germania Dodson was made aware that the provider will review the results with her at her upcoming visit on 1/23/17 . Mother verbalized understanding. Pt  worried about the results. Will forwarded the message to the provider.

## 2018-01-23 ENCOUNTER — OFFICE VISIT (OUTPATIENT)
Dept: OBGYN CLINIC | Age: 23
End: 2018-01-23

## 2018-01-23 VITALS
DIASTOLIC BLOOD PRESSURE: 72 MMHG | BODY MASS INDEX: 50.02 KG/M2 | WEIGHT: 293 LBS | SYSTOLIC BLOOD PRESSURE: 117 MMHG | HEIGHT: 64 IN | TEMPERATURE: 98.7 F | HEART RATE: 100 BPM | OXYGEN SATURATION: 97 %

## 2018-01-23 DIAGNOSIS — E28.2 PCOS (POLYCYSTIC OVARIAN SYNDROME): Primary | ICD-10-CM

## 2018-01-23 DIAGNOSIS — R06.83 SNORES: ICD-10-CM

## 2018-01-23 DIAGNOSIS — E66.01 OBESITY, MORBID, BMI 50 OR HIGHER (HCC): ICD-10-CM

## 2018-01-23 DIAGNOSIS — N94.6 DYSMENORRHEA: ICD-10-CM

## 2018-01-23 RX ORDER — NORETHINDRONE ACETATE AND ETHINYL ESTRADIOL 1MG-20(21)
1 KIT ORAL DAILY
Qty: 1 PACKAGE | Refills: 3 | Status: SHIPPED | OUTPATIENT
Start: 2018-01-23 | End: 2018-02-20 | Stop reason: ALTCHOICE

## 2018-01-23 NOTE — PATIENT INSTRUCTIONS
Snoring: Care Instructions  Your Care Instructions  Snoring is a noise that you may make while breathing during sleep. You snore when the flow of air from your mouth or nose to your lungs makes the tissues of your throat vibrate while you sleep. This usually is caused by a blockage or narrowing in your nose, mouth, or throat (airway). Snoring can be soft, loud, raspy, harsh, hoarse, or fluttering. Your bed partner may notice that you sleep with your mouth open and that you are restless while sleeping. If snoring interferes with your or your bed partner's sleep, either or both of you may feel tired during the day. You may be able to help reduce your snoring by making changes in your activities and in the way you sleep. Follow-up care is a key part of your treatment and safety. Be sure to make and go to all appointments, and call your doctor if you are having problems. It's also a good idea to know your test results and keep a list of the medicines you take. How can you care for yourself at home? · Lose weight, if needed. Many people who snore are overweight. Weight loss can help reduce the narrowing of the airway and might reduce or stop snoring. · Limit the use of alcohol and medicines. Drinking a lot of alcohol or taking certain medicines, especially sleeping pills or tranquilizers, before sleep may make snoring worse. · Go to bed at the same time each night, and get plenty of sleep. You may snore more when you have not had enough sleep. · Sleep on your side. Sleeping on your side may stop snoring. Try sewing a pocket in the middle of the back of your eTukTuk top, putting a tennis ball into the pocket, and stitching it closed. This will help keep you from sleeping on your back. · Treat breathing problems. Breathing problems caused by colds or allergies can disturb airflow. This can lead to snoring. · Use a device that helps keep your airway open during sleep.  This could be a device that you put in your mouth. Other examples include strips or disks that you use on your nose. · Do not smoke. Smoking can make snoring worse. If you need help quitting, talk to your doctor about stop-smoking programs and medicines. These can increase your chances of quitting for good. · Raise the head of your bed 4 to 6 inches by putting bricks under the legs of the bed. This may prevent your tongue from falling toward the back of the throat, which can make a blocked or narrow airway worse. Putting pillows under your head will not help. When should you call for help? Watch closely for changes in your health, and be sure to contact your doctor if:  ? · You snore, and you feel sleepy during the day. ? · Your sleeping partner or you notice that you gasp, choke, or stop breathing during sleep. ? · You do not get better as expected. Where can you learn more? Go to http://eder-tho.info/. Enter V232 in the search box to learn more about \"Snoring: Care Instructions. \"  Current as of: May 12, 2017  Content Version: 11.4  © 1813-8713 [employer]. Care instructions adapted under license by [employer] (which disclaims liability or warranty for this information). If you have questions about a medical condition or this instruction, always ask your healthcare professional. [address] any warranty or liability for your use of this information. Polycystic Ovary Syndrome: Care Instructions  Your Care Instructions  Polycystic ovary syndrome, or PCOS, means a woman's hormones are out of balance. It can cause problems with your periods and make it hard to get pregnant. Doctors don't know for sure what causes PCOS, but it seems to run in families. It also seems to be linked to obesity and a risk for diabetes. If you have PCOS, your sisters and daughters have a higher chance of getting it too. You may have other symptoms.  These include weight gain, acne, too much hair growth on the face or body, high blood pressure, and high blood sugar. Your ovaries may have cysts on them. These cysts are growths filled with fluid. Keep in mind that although you may not have regular periods, you can still get pregnant. Talk to your doctor about birth control if you do not want to get pregnant. Sometimes the hormone changes with PCOS can also make it hard for some women to get pregnant. If this is a concern, talk to your doctor about treatment for this problem. Women who have PCOS can go for months or longer with no period. Your doctor may recommend medicines that can help get your cycles back to normal.  Follow-up care is a key part of your treatment and safety. Be sure to make and go to all appointments, and call your doctor if you are having problems. It's also a good idea to know your test results and keep a list of the medicines you take. How can you care for yourself at home? · Take your medicines exactly as prescribed. Call your doctor if you think you are having a problem with your medicine. · Eat a healthy diet. Include fruits, vegetables, beans, and whole grains in your diet each day. · If you are overweight, losing weight can help with many of the symptoms of PCOS. Talk to your doctor about safe ways to lose weight. · Get at least 30 minutes of exercise on most days of the week. Walking is a good choice. Or you can run, swim, cycle, or play tennis or team sports. · For hair growth you don't want, try bleaching, plucking, electrolysis, or laser therapy. · Acne can be treated with over-the-counter medicines. Look for ones that have benzoyl peroxide or salicylic acid in them. When should you call for help? Call your doctor now or seek immediate medical care if:  ? · You have severe vaginal bleeding. ? · You have new or worse belly or pelvic pain. ? Watch closely for changes in your health, and be sure to contact your doctor if:  ? · You do not get better as expected. ? · You have unusual vaginal bleeding. Where can you learn more? Go to http://eder-tho.info/. Enter P368 in the search box to learn more about \"Polycystic Ovary Syndrome: Care Instructions. \"  Current as of: October 13, 2016  Content Version: 11.4  © 1944-7597 [employer]. Care instructions adapted under license by [employer] (which disclaims liability or warranty for this information). If you have questions about a medical condition or this instruction, always ask your healthcare professional. [address] any warranty or liability for your use of this information.

## 2018-01-23 NOTE — PROGRESS NOTES
Name: David Cervantes MRN: [id number]    YOB: 1995  Age: 25 y.o. Sex: female        Chief Complaint   Patient presents with    Follow-up     ultrasound results       HPI     1. Irregular menses  Reviewed possible dx with pt and discussed probable dx of PCOS, all of her questions were answered, she does snore, may need work for sleep apnea     - 17-OH PROGESTERONE normal  - BETA HCG, QT neg  - FREE ANDROGEN INDEX (JUDY) normal  - HEMOGLOBIN A1C not resulted  - PROLACTIN normal  - TSH 3RD GENERATION normal  - US PELV NON OBS see below     2. Obesity, morbid,   Initially lost weight 10-15 lbs, up a little from her last visit     3. Menorrhagia with irregular cycle  Seems to happen after she skips her periods, reviewed NSAID therapy, no period this month     4. Dysmenorrhea  Try NSAID therapy    OB History      Para Term  AB Living    0 0 0 0 0 0    SAB TAB Ectopic Molar Multiple Live Births    0 0 0 0 0 0        Obstetric Comments        Periods irregular, last 7-weeks days, flow heavy, moderate dysmenorrhea  History of sexually transmitted infections never  Last pap never             PGyn    History   Sexual Activity    Sexual activity: Not on file     Comment: never has been sexually active         Past Medical History:   Diagnosis Date    Asthma     Obesity, Class III, BMI 40-49.9 (morbid obesity) ([facility]        Past Surgical History:   Procedure Laterality Date    HX ORTHOPAEDIC  7years old    186 stitches in arm (trauma), general anesthesia used    REMOVAL GALLBLADDER N/A 2016    Dr. Brand Scripture       No Known Allergies    Current Outpatient Prescriptions on File Prior to Visit   Medication Sig Dispense Refill    albuterol (PROVENTIL HFA, VENTOLIN HFA, PROAIR HFA) 90 mcg/actuation inhaler Take 2 Puffs by inhalation every four (4) hours as needed. No current facility-administered medications on file prior to visit.         Review of Systems   Constitutional: Positive for malaise/fatigue. Gastrointestinal: Negative. Genitourinary: Negative. Visit Vitals    /72 (BP 1 Location: Right arm, BP Patient Position: At rest)    Pulse 100    Temp 98.7 °F (37.1 °C) (Oral)    Ht 5' 4\" (1.626 m)    Wt 293 lb (132.9 kg)    LMP 12/04/2017 (Exact Date)    SpO2 97%    BMI 50.29 kg/m2       GENERAL:  Well developed, well nourished, in no distress      US  Findings:       Exam performed transabdominally. Uterus measures 9.3 x 4.1 x 4.9 cm and has a normal appearance. The endometrial  complex measures 2 mm in thickness and has a normal appearance. Right ovary measures 3.2 x 2.3 x 4.2 cm (volume of 16.6 mL). The right ovary has  a normal appearance. Left ovary measures 3.0 x 1.8 x 3.0 cm (volume of 8.4 mL). The left ovary has a  normal appearance. No free fluid or adnexal mass. Limited visualization of the bladder appears normal.     IMPRESSION  Impression: Exam essentially normal.      No results found for this or any previous visit (from the past 24 hour(s)). 1. PCOS (polycystic ovarian syndrome)  Reviewed labs and US with pt, reviewed mediterranean/[place] Diet, reviewed The Obesity Code, intermittent fasting, will start [address] [place] [address],[address]lo[address], & 5    2. Obesity, morbid, BMI 50 or higher ([facility] [address]  Reviewed losing weight    3. Dysmenorrhea  Will improve on birth control    4.  Snores  Likely has sleep apnea, will refer for testing      F/U 12 weeks      Face to face time 25 mins with greater than 50% counseling

## 2018-02-20 RX ORDER — NORGESTIMATE AND ETHINYL ESTRADIOL 0.25-0.035
1 KIT ORAL DAILY
Qty: 1 DOSE PACK | Refills: 3 | Status: SHIPPED | OUTPATIENT
Start: 2018-02-20 | End: 2018-10-21

## 2018-10-21 ENCOUNTER — APPOINTMENT (OUTPATIENT)
Dept: GENERAL RADIOLOGY | Age: 23
End: 2018-10-21
Attending: EMERGENCY MEDICINE
Payer: COMMERCIAL

## 2018-10-21 ENCOUNTER — HOSPITAL ENCOUNTER (EMERGENCY)
Age: 23
Discharge: HOME | End: 2018-10-21
Attending: EMERGENCY MEDICINE
Payer: COMMERCIAL

## 2018-10-21 VITALS
DIASTOLIC BLOOD PRESSURE: 57 MMHG | BODY MASS INDEX: 50.02 KG/M2 | RESPIRATION RATE: 16 BRPM | WEIGHT: 293 LBS | OXYGEN SATURATION: 100 % | SYSTOLIC BLOOD PRESSURE: 104 MMHG | TEMPERATURE: 98.3 F | HEART RATE: 98 BPM | HEIGHT: 64 IN

## 2018-10-21 DIAGNOSIS — R10.9 ABDOMINAL PAIN, UNSPECIFIED ABDOMINAL LOCATION: Primary | ICD-10-CM

## 2018-10-21 DIAGNOSIS — R73.9 HYPERGLYCEMIA: ICD-10-CM

## 2018-10-21 LAB
ALBUMIN SERPL-MCNC: 3.4 G/DL (ref 3.4–5)
ALBUMIN/GLOB SERPL: 0.9 {RATIO} (ref 0.8–1.7)
ALP SERPL-CCNC: 94 U/L (ref 45–117)
ALT SERPL-CCNC: 83 U/L (ref 13–56)
ANION GAP SERPL CALC-SCNC: 8 MMOL/L (ref 3–18)
APPEARANCE UR: ABNORMAL
AST SERPL-CCNC: 53 U/L (ref 15–37)
BASOPHILS # BLD: 0 K/UL (ref 0–0.1)
BASOPHILS NFR BLD: 0 % (ref 0–2)
BILIRUB DIRECT SERPL-MCNC: 0.1 MG/DL (ref 0–0.2)
BILIRUB SERPL-MCNC: 0.3 MG/DL (ref 0.2–1)
BILIRUB UR QL: NEGATIVE
BUN SERPL-MCNC: 11 MG/DL (ref 7–18)
BUN/CREAT SERPL: 12 (ref 12–20)
CALCIUM SERPL-MCNC: 8.4 MG/DL (ref 8.5–10.1)
CHLORIDE SERPL-SCNC: 103 MMOL/L (ref 100–108)
CO2 SERPL-SCNC: 27 MMOL/L (ref 21–32)
COLOR UR: ABNORMAL
CREAT SERPL-MCNC: 0.91 MG/DL (ref 0.6–1.3)
DIFFERENTIAL METHOD BLD: ABNORMAL
EOSINOPHIL # BLD: 0.1 K/UL (ref 0–0.4)
EOSINOPHIL NFR BLD: 2 % (ref 0–5)
ERYTHROCYTE [DISTWIDTH] IN BLOOD BY AUTOMATED COUNT: 13.7 % (ref 11.6–14.5)
GLOBULIN SER CALC-MCNC: 3.7 G/DL (ref 2–4)
GLUCOSE SERPL-MCNC: 303 MG/DL (ref 74–99)
GLUCOSE UR STRIP.AUTO-MCNC: >1000 MG/DL
HCG UR QL: NEGATIVE
HCT VFR BLD AUTO: 41.3 % (ref 35–45)
HGB BLD-MCNC: 13.1 G/DL (ref 12–16)
HGB UR QL STRIP: ABNORMAL
KETONES UR QL STRIP.AUTO: ABNORMAL MG/DL
LEUKOCYTE ESTERASE UR QL STRIP.AUTO: NEGATIVE
LIPASE SERPL-CCNC: 174 U/L (ref 73–393)
LYMPHOCYTES # BLD: 2 K/UL (ref 0.9–3.6)
LYMPHOCYTES NFR BLD: 29 % (ref 21–52)
MCH RBC QN AUTO: 26.8 PG (ref 24–34)
MCHC RBC AUTO-ENTMCNC: 31.7 G/DL (ref 31–37)
MCV RBC AUTO: 84.6 FL (ref 74–97)
MONOCYTES # BLD: 0.5 K/UL (ref 0.05–1.2)
MONOCYTES NFR BLD: 7 % (ref 3–10)
NEUTS SEG # BLD: 4.3 K/UL (ref 1.8–8)
NEUTS SEG NFR BLD: 62 % (ref 40–73)
NITRITE UR QL STRIP.AUTO: NEGATIVE
PH UR STRIP: 6.5 [PH] (ref 5–8)
PLATELET # BLD AUTO: 243 K/UL (ref 135–420)
PMV BLD AUTO: 12.7 FL (ref 9.2–11.8)
POTASSIUM SERPL-SCNC: 3.9 MMOL/L (ref 3.5–5.5)
PROT SERPL-MCNC: 7.1 G/DL (ref 6.4–8.2)
PROT UR STRIP-MCNC: 30 MG/DL
RBC # BLD AUTO: 4.88 M/UL (ref 4.2–5.3)
RBC #/AREA URNS HPF: NORMAL /HPF (ref 0–5)
SODIUM SERPL-SCNC: 138 MMOL/L (ref 136–145)
SP GR UR REFRACTOMETRY: >1.03 (ref 1–1.03)
UROBILINOGEN UR QL STRIP.AUTO: 1 EU/DL (ref 0.2–1)
WBC # BLD AUTO: 6.9 K/UL (ref 4.6–13.2)
WBC URNS QL MICRO: NORMAL /HPF (ref 0–4)

## 2018-10-21 PROCEDURE — 96375 TX/PRO/DX INJ NEW DRUG ADDON: CPT

## 2018-10-21 PROCEDURE — 80076 HEPATIC FUNCTION PANEL: CPT | Performed by: EMERGENCY MEDICINE

## 2018-10-21 PROCEDURE — 74022 RADEX COMPL AQT ABD SERIES: CPT

## 2018-10-21 PROCEDURE — 85025 COMPLETE CBC W/AUTO DIFF WBC: CPT | Performed by: EMERGENCY MEDICINE

## 2018-10-21 PROCEDURE — 83690 ASSAY OF LIPASE: CPT | Performed by: EMERGENCY MEDICINE

## 2018-10-21 PROCEDURE — 74011250636 HC RX REV CODE- 250/636: Performed by: EMERGENCY MEDICINE

## 2018-10-21 PROCEDURE — 96374 THER/PROPH/DIAG INJ IV PUSH: CPT

## 2018-10-21 PROCEDURE — 81001 URINALYSIS AUTO W/SCOPE: CPT | Performed by: EMERGENCY MEDICINE

## 2018-10-21 PROCEDURE — 96361 HYDRATE IV INFUSION ADD-ON: CPT

## 2018-10-21 PROCEDURE — 99283 EMERGENCY DEPT VISIT LOW MDM: CPT

## 2018-10-21 PROCEDURE — 80048 BASIC METABOLIC PNL TOTAL CA: CPT | Performed by: EMERGENCY MEDICINE

## 2018-10-21 PROCEDURE — 81025 URINE PREGNANCY TEST: CPT | Performed by: EMERGENCY MEDICINE

## 2018-10-21 RX ORDER — DOCUSATE SODIUM 100 MG/1
100 CAPSULE, LIQUID FILLED ORAL 2 TIMES DAILY
Qty: 20 CAP | Refills: 0 | Status: SHIPPED | OUTPATIENT
Start: 2018-10-21 | End: 2018-10-31

## 2018-10-21 RX ORDER — MAGNESIUM CITRATE
296 SOLUTION, ORAL ORAL
Qty: 1 BOTTLE | Refills: 0 | Status: SHIPPED | OUTPATIENT
Start: 2018-10-21 | End: 2018-10-21

## 2018-10-21 RX ORDER — METFORMIN HYDROCHLORIDE 500 MG/1
500 TABLET ORAL
Qty: 7 TAB | Refills: 0 | Status: SHIPPED | OUTPATIENT
Start: 2018-10-21 | End: 2018-10-28

## 2018-10-21 RX ORDER — SODIUM CHLORIDE 9 MG/ML
1000 INJECTION, SOLUTION INTRAVENOUS ONCE
Status: COMPLETED | OUTPATIENT
Start: 2018-10-21 | End: 2018-10-21

## 2018-10-21 RX ORDER — KETOROLAC TROMETHAMINE 10 MG/1
10 TABLET, FILM COATED ORAL
Qty: 14 TAB | Refills: 0 | Status: SHIPPED | OUTPATIENT
Start: 2018-10-21 | End: 2019-12-11

## 2018-10-21 RX ORDER — ONDANSETRON 2 MG/ML
4 INJECTION INTRAMUSCULAR; INTRAVENOUS
Status: COMPLETED | OUTPATIENT
Start: 2018-10-21 | End: 2018-10-21

## 2018-10-21 RX ORDER — ONDANSETRON 4 MG/1
4 TABLET, ORALLY DISINTEGRATING ORAL
Qty: 14 TAB | Refills: 0 | Status: SHIPPED | OUTPATIENT
Start: 2018-10-21 | End: 2019-12-11

## 2018-10-21 RX ORDER — KETOROLAC TROMETHAMINE 30 MG/ML
30 INJECTION, SOLUTION INTRAMUSCULAR; INTRAVENOUS
Status: COMPLETED | OUTPATIENT
Start: 2018-10-21 | End: 2018-10-21

## 2018-10-21 RX ADMIN — KETOROLAC TROMETHAMINE 30 MG: 30 INJECTION, SOLUTION INTRAMUSCULAR at 20:31

## 2018-10-21 RX ADMIN — SODIUM CHLORIDE 1000 ML: 900 INJECTION, SOLUTION INTRAVENOUS at 20:26

## 2018-10-21 RX ADMIN — ONDANSETRON 4 MG: 2 INJECTION INTRAMUSCULAR; INTRAVENOUS at 20:28

## 2018-10-21 NOTE — ED PROVIDER NOTES
EMERGENCY DEPARTMENT HISTORY AND PHYSICAL EXAM 
 
7:57 PM 
 
 
Date: 10/21/2018 Patient Name: Melania De La O History of Presenting Illness Chief Complaint Patient presents with  Abdominal Pain History Provided By: Patient and Patient's Mother Chief Complaint: Abdominal pain Duration: 3 Hours Timing:  Acute Location: Lower abdomen Quality: Aching and Constant Severity: Moderate Modifying Factors: No modifying or aggravating factors were reported. Associated Symptoms: denies any other associated signs or symptoms Additional History (Context): Melania De La O is a 26 y.o. female with No significant past medical history who presents with acute moderate lower abdominal pain onset 3 hours ago, described as aching and constant. She denies dysuria, back pain, pain in upper abdomen, and chance of pregnancy. Her mother reports that she has PSHx of cholecystectomy and states her daughter is borderline diabetic. She reports NKDA. No other concerns or symptoms at this time. PCP: None Current Outpatient Medications Medication Sig Dispense Refill  ondansetron (ZOFRAN ODT) 4 mg disintegrating tablet Take 1 Tab by mouth every eight (8) hours as needed for Nausea. 14 Tab 0  
 ketorolac (TORADOL) 10 mg tablet Take 1 Tab by mouth every eight (8) hours as needed for Pain. 14 Tab 0  
 docusate sodium (COLACE) 100 mg capsule Take 1 Cap by mouth two (2) times a day for 10 days. 20 Cap 0  
 metFORMIN (GLUCOPHAGE) 500 mg tablet Take 1 Tab by mouth daily (with breakfast) for 7 days. 7 Tab 0  
 albuterol (PROVENTIL HFA, VENTOLIN HFA, PROAIR HFA) 90 mcg/actuation inhaler Take 2 Puffs by inhalation every four (4) hours as needed. Past History Past Medical History: 
Past Medical History:  
Diagnosis Date  Asthma  Obesity, Class III, BMI 40-49.9 (morbid obesity) ([facility] [address] Past Surgical History: 
Past Surgical History:  
Procedure Laterality Date  HX ORTHOPAEDIC  7years old 186 stitches in arm (trauma), general anesthesia used  REMOVAL GALLBLADDER N/A 12/22/2016 Dr. Walker Records Family History: 
Family History Problem Relation Age of Onset  Arthritis-osteo Mother   
     throughout her spine  Glaucoma Maternal Grandmother  COPD Maternal Grandmother  Kidney Disease Maternal Grandfather  Heart Disease Maternal Grandfather  Hypertension Paternal Grandmother  Hypertension Paternal Grandfather  Diabetes Paternal Grandfather  Cancer Maternal Aunt   
     thyroid  Other Maternal Aunt \"tumors in uterus that weighed 50 lbs\"  Breast Cancer Maternal Aunt Social History: 
Social History Tobacco Use  Smoking status: Never Smoker  Smokeless tobacco: Never Used Substance Use Topics  Alcohol use: Yes Alcohol/week: 0.6 oz Types: 1 Glasses of wine per week Comment: 5-6 times per year  Drug use: No  
 
 
Allergies: 
No Known Allergies Review of Systems Review of Systems Constitutional: Negative for fever. HENT: Negative for congestion. Respiratory: Negative for cough and shortness of breath. Cardiovascular: Negative for chest pain. Gastrointestinal: Positive for abdominal pain (pain marge lower abdomen). Negative for vomiting. Genitourinary: Negative for dysuria. Musculoskeletal: Negative for back pain. Skin: Negative for rash. Neurological: Negative for light-headedness. All other systems reviewed and are negative. Physical Exam  
 
Visit Vitals /57 Pulse 98 Temp 98.3 °F (36.8 °C) Resp 16 Ht 5' 4\" (1.626 m) Wt 134.7 kg (297 lb) LMP 10/11/2018 SpO2 100% BMI 50.98 kg/m² Physical Exam  
Constitutional: She is oriented to person, place, and time. She appears well-developed. HENT:  
Head: Normocephalic. Mouth/Throat: Oropharynx is clear and moist.  
Eyes: Pupils are equal, round, and reactive to light. Neck: Normal range of motion. Cardiovascular: Normal rate and normal heart sounds. No murmur heard. Pulmonary/Chest: Effort normal. She has no wheezes. She has no rales. Abdominal: Soft. There is tenderness in the periumbilical area. Musculoskeletal: Normal range of motion. She exhibits no edema. Neurological: She is alert and oriented to person, place, and time. Skin: Skin is warm and dry. Nursing note and vitals reviewed. Diagnostic Study Results Vitals: 
No data found. Medications ordered:  
Medications  
0.9% sodium chloride infusion 1,000 mL (0 mL IntraVENous IV Completed 10/21/18 2139)  
ondansetron (ZOFRAN) injection 4 mg (4 mg IntraVENous Given 10/21/18 2028)  
ketorolac (TORADOL) injection 30 mg (30 mg IntraVENous Given 10/21/18 2031) Lab findings: 
No results found for this or any previous visit (from the past 12 hour(s)). X-Ray, CT or other radiology findings or impressions: XR ABD ACUTE W 1 V CHEST Final Result Preliminary Result IMPRESSION: 
 
Moderate Constipation under X-ray. Normal otherwise. Interpreted by No att. providers found 9:33 PM 
 
 
Progress notes, Consult notes or additional Procedure notes:  
9:34 PM Patient Re-evaluated. She is feeling much better. She has no pain or nausea. She declines further treatment, including CAT scan is declined after discussion of risks and benefits. She was made aware of her elevated glucose and needs close follow up. elev but no s/o dka. To hold ct per pt request.  Mild stool burden on kub. To tx, pt verbalizes understanding of detailed ret inst given. No emc. Stable for discharge Disposition: 
Diagnosis: 1. Abdominal pain, unspecified abdominal location 2. Hyperglycemia Disposition: home Follow-up Information Follow up With Specialties Details Why Contact Info  [address] Schedule an appointment as soon as possible for a visit in 2 days  [address] [place] [address] Suite [facility] Timothy Champion 
[phone] Medication List  
  
START taking these medications   
docusate sodium 100 mg capsule Commonly known as:  Eusebio Eans Take 1 Cap by mouth two (2) times a day for 10 days. ketorolac 10 mg tablet Commonly known as:  TORADOL Take 1 Tab by mouth every eight (8) hours as needed for Pain. 
  
metFORMIN 500 mg tablet Commonly known as:  GLUCOPHAGE Take 1 Tab by mouth daily (with breakfast) for 7 days. ondansetron 4 mg disintegrating tablet Commonly known as:  ZOFRAN ODT Take 1 Tab by mouth every eight (8) hours as needed for Nausea. ASK your doctor about these medications   
albuterol 90 mcg/actuation inhaler Commonly known as:  PROVENTIL HFA, VENTOLIN HFA, PROAIR HFA 
  
magnesium citrate solution Take 296 mL by mouth now for 1 dose. Ask about: Should I take this medication? Where to Get Your Medications Information about where to get these medications is not yet available Ask your nurse or doctor about these medications · docusate sodium 100 mg capsule · ketorolac 10 mg tablet · magnesium citrate solution · metFORMIN 500 mg tablet · ondansetron 4 mg disintegrating tablet Scribe Attestation [place] acting as a scribe for and in the presence of Mario Del Rio MD     
October 21, 2018 at 7:57 PM 
    
Provider Attestation:     
I personally performed the services described in the documentation, reviewed the documentation, as recorded by the scribe in my presence, and it accurately and completely records my words and actions.  October 21, 2018 at 7:57 PM - Mario Del Rio MD

## 2018-10-21 NOTE — ED TRIAGE NOTES
Patient states lower abdominal pain that began today. States last bowel movement this morning. Advises that bowel movement was normal for her. Denies vomiting, diarrhea or constipation.

## 2018-10-21 NOTE — LETTER
[address] [place] [address] Dr [facility] EMERGENCY DEPT 
[address] [facility] [id number] [phone] Work/School Note Date: 10/21/2018 To Whom It May concern: 
 
Raul Michelle was seen and treated today in the emergency room by the following provider(s): 
Attending Provider: Ladonna Navarrete MD.   
 
Raul Michelle - please excuse her from school 10/22/2018. Sincerely, Nils Heimlich RN BSN KAMALJIT NRP

## 2018-10-22 NOTE — ED NOTES
Pt discharged to home. Instructions reviewed with pt; importance of high fiber, low sugar diet discussed and following up with a PCP. Instructed to return to ED for worsening pain/fevers/other concerns. Pt affect is calm/conversant, respirations regular/non labored, skin warm/dry, and pt is pain free at this time. No distress noted.

## 2018-10-22 NOTE — DISCHARGE INSTRUCTIONS
Return for pain, fever, shortness of breath, vomiting, decreased fluid intake, weakness, numbness, dizziness, or any change or concerns. Learning About High Blood Sugar  What is high blood sugar? Your body turns the food you eat into glucose (sugar), which it uses for energy. But if your body isn't able to use the sugar right away, it can build up in your blood and lead to high blood sugar. When the amount of sugar in your blood stays too high for too much of the time, you may have diabetes. Diabetes is a disease that can cause serious health problems. The good news is that lifestyle changes may help you get your blood sugar back to normal and avoid or delay diabetes. What causes high blood sugar? Sugar (glucose) can build up in your blood if you:  · Are overweight. · Have a family history of diabetes. · Take certain medicines, such as steroids. What are the symptoms? Having high blood sugar may not cause any symptoms at all. Or it may make you feel very thirsty or very hungry. You may also urinate more often than usual, have blurry vision, or lose weight without trying. How is high blood sugar treated? You can take steps to lower your blood sugar level if you understand what makes it get higher. Your doctor may want you to learn how to test your blood sugar level at home. Then you can see how illness, stress, or different kinds of food or medicine raise or lower your blood sugar level. Other tests may be needed to see if you have diabetes. How can you prevent high blood sugar? · Watch your weight. If you're overweight, losing just a small amount of weight may help. Reducing fat around your waist is most important. · Limit the amount of calories, sweets, and unhealthy fat you eat. Ask your doctor if a dietitian can help you. A registered dietitian can help you create meal plans that fit your lifestyle. · Get at least 30 minutes of exercise on most days of the week.  Exercise helps control your blood sugar. It also helps you maintain a healthy weight. Walking is a good choice. You also may want to do other activities, such as running, swimming, cycling, or playing tennis or team sports. · If your doctor prescribed medicines, take them exactly as prescribed. Call your doctor if you think you are having a problem with your medicine. You will get more details on the specific medicines your doctor prescribes. Follow-up care is a key part of your treatment and safety. Be sure to make and go to all appointments, and call your doctor if you are having problems. It's also a good idea to know your test results and keep a list of the medicines you take. Where can you learn more? Go to http://eder-tho.info/. Enter O108 in the search box to learn more about \"Learning About High Blood Sugar. \"  Current as of: December 7, 2017  Content Version: 11.8  © 8512-2253 [employer]. Care instructions adapted under license by [employer] (which disclaims liability or warranty for this information). If you have questions about a medical condition or this instruction, always ask your healthcare professional. [place] [address] any warranty or liability for your use of this information. Abdominal Pain: Care Instructions  Your Care Instructions    Abdominal pain has many possible causes. Some aren't serious and get better on their own in a few days. Others need more testing and treatment. If your pain continues or gets worse, you need to be rechecked and may need more tests to find out what is wrong. You may need surgery to correct the problem. Don't ignore new symptoms, such as fever, nausea and vomiting, urination problems, pain that gets worse, and dizziness. These may be signs of a more serious problem. Your doctor may have recommended a follow-up visit in the next 8 to 12 hours.  If you are not getting better, you may need more tests or treatment. The doctor has checked you carefully, but problems can develop later. If you notice any problems or new symptoms, get medical treatment right away. Follow-up care is a key part of your treatment and safety. Be sure to make and go to all appointments, and call your doctor if you are having problems. It's also a good idea to know your test results and keep a list of the medicines you take. How can you care for yourself at home? · Rest until you feel better. · To prevent dehydration, drink plenty of fluids, enough so that your urine is light yellow or clear like water. Choose water and other caffeine-free clear liquids until you feel better. If you have kidney, heart, or liver disease and have to limit fluids, talk with your doctor before you increase the amount of fluids you drink. · If your stomach is upset, eat mild foods, such as rice, dry toast or crackers, bananas, and applesauce. Try eating several small meals instead of two or three large ones. · Wait until 48 hours after all symptoms have gone away before you have spicy foods, alcohol, and drinks that contain caffeine. · Do not eat foods that are high in fat. · Avoid anti-inflammatory medicines such as aspirin, ibuprofen (Advil, Motrin), and naproxen (Aleve). These can cause stomach upset. Talk to your doctor if you take daily aspirin for another health problem. When should you call for help? Call 911 anytime you think you may need emergency care.  For example, call if:    · You passed out (lost consciousness).     · You pass maroon or very bloody stools.     · You vomit blood or what looks like coffee grounds.     · You have new, severe belly pain.    Call your doctor now or seek immediate medical care if:    · Your pain gets worse, especially if it becomes focused in one area of your belly.     · You have a new or higher fever.     · Your stools are black and look like tar, or they have streaks of blood.     · You have unexpected vaginal bleeding.     · You have symptoms of a urinary tract infection. These may include:  ? Pain when you urinate. ? Urinating more often than usual.  ? Blood in your urine.     · You are dizzy or lightheaded, or you feel like you may faint.    Watch closely for changes in your health, and be sure to contact your doctor if:    · You are not getting better after 1 day (24 hours). Where can you learn more? Go to http://eder-tho.info/. Enter R793 in the search box to learn more about \"Abdominal Pain: Care Instructions. \"  Current as of: November 20, 2017  Content Version: 11.8  © 7632-0665 [employer]. Care instructions adapted under license by [employer] (which disclaims liability or warranty for this information). If you have questions about a medical condition or this instruction, always ask your healthcare professional. [address] any warranty or liability for your use of this information.

## 2018-10-22 NOTE — ED NOTES
Pt awake/alert/oriented; affect is calm/conversant, respirations regular/non labored, skin warm and dry. No distress noted at this time. Plan of care/wait times explained/understood. Pt and family voice understanding; no distress noted at this time.

## 2019-12-11 ENCOUNTER — HOSPITAL ENCOUNTER (EMERGENCY)
Age: 24
Discharge: HOME | End: 2019-12-12
Attending: EMERGENCY MEDICINE
Payer: SELF-PAY

## 2019-12-11 VITALS
WEIGHT: 284 LBS | DIASTOLIC BLOOD PRESSURE: 82 MMHG | RESPIRATION RATE: 16 BRPM | HEART RATE: 81 BPM | TEMPERATURE: 98.6 F | OXYGEN SATURATION: 100 % | BODY MASS INDEX: 48.49 KG/M2 | SYSTOLIC BLOOD PRESSURE: 150 MMHG | HEIGHT: 64 IN

## 2019-12-11 DIAGNOSIS — S63.502A SPRAIN OF LEFT WRIST, INITIAL ENCOUNTER: Primary | ICD-10-CM

## 2019-12-11 DIAGNOSIS — S63.92XA HAND SPRAIN, LEFT, INITIAL ENCOUNTER: ICD-10-CM

## 2019-12-11 PROCEDURE — 99283 EMERGENCY DEPT VISIT LOW MDM: CPT

## 2019-12-11 NOTE — LETTER
[facility] EMERGENCY DEPT 
[address] [facility] [id number] [phone] Work/School Note Date: 12/11/2019 To Whom It May concern: 
 
Tha Mejia was seen and treated today in the emergency room by the following provider(s): 
Attending Provider: Mia Michaels MD.   
 
Tha Mejia may return to work on 12/16/19. Sincerely, 
 
 
 
 
Dr. Min Camejo

## 2019-12-12 ENCOUNTER — APPOINTMENT (OUTPATIENT)
Dept: GENERAL RADIOLOGY | Age: 24
End: 2019-12-12
Attending: EMERGENCY MEDICINE
Payer: SELF-PAY

## 2019-12-12 PROCEDURE — L3908 WHO COCK-UP NONMOLDE PRE OTS: HCPCS

## 2019-12-12 PROCEDURE — 73130 X-RAY EXAM OF HAND: CPT

## 2019-12-12 NOTE — ED NOTES
Patient updated on test results are still pending. Encouraged to voice any concerns, and all questions/concerns addressed. Frequent rounding provided to address any concerns. Offered comfort measures; warm blanket, reposition, dimmed lights. Patient denies any discomforts at this time. Call bell remains at bedside. Hourly rounding for comfort and pain control remains in progress.

## 2019-12-12 NOTE — ED PROVIDER NOTES
Pt c/o left hand pain, rad to wrist, since lifted up 50 lb bucket at work 3 days ago. No direct injury. No open wound. No weakness or numbness. No rash. No bleeding. Mild swelling. Took naproxyn, last dose pta, mild relief. Past Medical History:   Diagnosis Date    Asthma     Obesity, Class III, BMI 40-49.9 (morbid obesity) (HCC)        Past Surgical History:   Procedure Laterality Date    HX ORTHOPAEDIC  7years old    80 stitches in arm (trauma), general anesthesia used    REMOVAL GALLBLADDER N/A 12/22/2016    Dr. Ireland Stanford         Family History:   Problem Relation Age of Onset    Arthritis-osteo Mother         throughout her spine    Glaucoma Maternal Grandmother     COPD Maternal Grandmother     Kidney Disease Maternal Grandfather     Heart Disease Maternal Grandfather     Hypertension Paternal Grandmother     Hypertension Paternal Grandfather     Diabetes Paternal Grandfather     Cancer Maternal Aunt         thyroid    Other Maternal Aunt         \"tumors in uterus that weighed 48 lbs\"    Breast Cancer Maternal Aunt        Social History     Socioeconomic History    Marital status: SINGLE     Spouse name: Not on file    Number of children: Not on file    Years of education: Not on file    Highest education level: Not on file   Occupational History    Not on file   Social Needs    Financial resource strain: Not on file    Food insecurity:     Worry: Not on file     Inability: Not on file    Transportation needs:     Medical: Not on file     Non-medical: Not on file   Tobacco Use    Smoking status: Never Smoker    Smokeless tobacco: Never Used   Substance and Sexual Activity    Alcohol use:  Yes     Alcohol/week: 1.0 standard drinks     Types: 1 Glasses of wine per week     Comment: 5-6 times per year    Drug use: No    Sexual activity: Not on file     Comment: never has been sexually active   Lifestyle    Physical activity:     Days per week: Not on file     Minutes per session: Not on file    Stress: Not on file   Relationships    Social connections:     Talks on phone: Not on file     Gets together: Not on file     Attends Denominational service: Not on file     Active member of club or organization: Not on file     Attends meetings of clubs or organizations: Not on file     Relationship status: Not on file    Intimate partner violence:     Fear of current or ex partner: Not on file     Emotionally abused: Not on file     Physically abused: Not on file     Forced sexual activity: Not on file   Other Topics Concern    Not on file   Social History Narrative    Not on file         ALLERGIES: Patient has no known allergies. Review of Systems   Musculoskeletal: Positive for myalgias. Skin: Negative for wound. Neurological: Negative for weakness and numbness. All other systems reviewed and are negative. Vitals:    12/11/19 2307   BP: 150/82   Pulse: 81   Resp: 16   Temp: 98.6 °F (37 °C)   SpO2: 100%   Weight: 128.8 kg (284 lb)   Height: 5' 4\" (1.626 m)            Physical Exam  Vitals signs and nursing note reviewed. Constitutional:       Appearance: She is well-developed. She is not diaphoretic. HENT:      Head: Normocephalic and atraumatic. Pulmonary:      Effort: Pulmonary effort is normal.   Musculoskeletal:         General: Tenderness present. Comments: + ttp left dorsal hand, mild swelling. No erythema/warmth, + from   Skin:     General: Skin is dry. Capillary Refill: Capillary refill takes less than 2 seconds. Findings: No rash. Neurological:      Mental Status: She is alert and oriented to person, place, and time. MDM       Procedures    Vitals:  Patient Vitals for the past 12 hrs:   Temp Pulse Resp BP SpO2   12/11/19 2307 98.6 °F (37 °C) 81 16 150/82 100 %         Medications ordered:   Medications - No data to display      Lab findings:  No results found for this or any previous visit (from the past 12 hour(s)).         X-Ray, CT or other radiology findings or impressions:  XR HAND LT MIN 3 V    (Results Pending)       Progress notes, Consult notes or additional Procedure notes:   No fx. C/w strain. Stable for dc and close f/u not c/w cellulitis/abscess. nvi      Diagnosis:   1. Sprain of left wrist, initial encounter    2.  Hand sprain, left, initial encounter        Disposition: home    Follow-up Information     Follow up With Specialties Details Why Contact Info    Mya Perez, DO Orthopedic Surgery Schedule an appointment as soon as possible for a visit in 3 days  [address] [address] ([facility])      [address] [facility] EMERGENCY DEPT Emergency Medicine Go to As needed [address] [facility]  [phone]           Discharge Medication List as of 12/12/2019 12:42 AM      CONTINUE these medications which have NOT CHANGED    Details   albuterol (PROVENTIL HFA, VENTOLIN HFA, PROAIR HFA) 90 mcg/actuation inhaler Take 2 Puffs by inhalation every four (4) hours as needed., Historical Med

## 2019-12-12 NOTE — DISCHARGE INSTRUCTIONS
Return for pain, fever not resolving with motrin or tylenol, shortness of breath, vomiting, decreased fluid intake, weakness, numbness, dizziness, or any change or concerns. Patient Education        Hand Sprain: Care Instructions  Your Care Instructions  A hand sprain occurs when you stretch or tear a ligament in your hand. Ligaments are the tough tissues that connect one bone to another. Most hand sprains will heal with treatment you can do at home. Follow-up care is a key part of your treatment and safety. Be sure to make and go to all appointments, and call your doctor if you are having problems. It's also a good idea to know your test results and keep a list of the medicines you take. How can you care for yourself at home? · If your doctor gave you a splint or immobilizer, wear it as directed. This will help keep swelling down and help your hand heal.  · Follow your doctor's directions for exercise and other activity. · For the first 2 days after your injury, avoid things that might increase swelling, such as hot showers, hot tubs, or hot packs. · Put ice or a cold pack on your hand for 10 to 20 minutes at a time to stop swelling. Try this every 1 to 2 hours for 3 days (when you are awake) or until the swelling goes down. Put a thin cloth between the ice pack and your skin. Keep your splint dry. · After 2 or 3 days, if your swelling is gone, put a heating pad (set on low) or a warm cloth on your hand. Some experts suggest that you go back and forth between hot and cold treatments. · Prop up your hand on a pillow when you ice it or anytime you sit or lie down. Try to keep it above the level of your heart. This will help reduce swelling. · Take pain medicines exactly as directed. ? If the doctor gave you a prescription medicine for pain, take it as prescribed. ? If you are not taking a prescription pain medicine, ask your doctor if you can take an over-the-counter medicine.   · Return to your usual level of activity slowly. When should you call for help? Call your doctor now or seek immediate medical care if:    · Your pain is worse.     · You have new or increased swelling in your hand.     · You cannot move your hand.     · You have tingling, weakness, or numbness in your hand or fingers.     · Your hand or fingers are cool or pale or change color.     · You have a fever.     · Your hand or fingers are red.    Watch closely for changes in your health, and be sure to contact your doctor if:    · Your hand does not get better as expected. Where can you learn more? Go to http://eder-tho.info/. Enter Z548 in the search box to learn more about \"Hand Sprain: Care Instructions. \"  Current as of: June 26, 2019  Content Version: 12.2  © 3108-2740 [employer]. Care instructions adapted under license by [employer] (which disclaims liability or warranty for this information). If you have questions about a medical condition or this instruction, always ask your healthcare professional. [address] [address] any warranty or liability for your use of this information. Patient Education        Strain or Sprain: Care Instructions  Your Care Instructions    A strain happens when you overstretch, or pull, a muscle. A sprain occurs when you stretch or tear a ligament, the tough tissue that connects one bone to another. These problems can happen when you exercise or lift something or when you are in an accident. Rest and other home care can help strains and sprains heal.  The doctor has checked you carefully, but problems can develop later. If you notice any problems or new symptoms,  get medical treatment right away. Follow-up care is a key part of your treatment and safety. Be sure to make and go to all appointments, and call your doctor if you are having problems.  It's also a good idea to know your test results and keep a list of the medicines you take.  How can you care for yourself at home? · If your doctor gave you a sling, splint, brace, or immobilizer, use it exactly as directed. · Rest the strained or sprained area, and follow your doctor's advice about when you can be active again. · Put ice or a cold pack on the sore area for 10 to 20 minutes at a time to stop swelling. Try this every 1 to 2 hours for 3 days (when you are awake) or until the swelling goes down. Put a thin cloth between the ice pack and your skin. Keep your splint or brace dry. · Prop up a sore arm or leg on a pillow when you ice it or anytime you sit or lie down. Try to keep it higher than the level of your heart. This will help reduce swelling. · Take pain medicines exactly as directed. ? If the doctor gave you a prescription medicine for pain, take it as prescribed. ? If you are not taking a prescription pain medicine, ask your doctor if you can take an over-the-counter medicine. · Do exercises as directed by your doctor or physical therapist.  · Return to your usual level of activity slowly. · Do not do anything that makes the pain worse. When should you call for help? Call your doctor now or seek immediate medical care if:    · You have severe or increasing pain.     · You have tingling, weakness, or numbness in the area.     · The area turns cold or changes color.     · Your cast or splint feels too tight.     · You have symptoms of a blood clot, such as:  ? Pain in your calf, back of the knee, thigh, or groin. ? Redness and swelling in your leg or groin.     · You cannot move the strained part of your body.    Watch closely for changes in your health, and be sure to contact your doctor if:    · You do not get better as expected. Where can you learn more? Go to http://eder-tho.info/. Enter D601 in the search box to learn more about \"Strain or Sprain: Care Instructions. \"  Current as of: June 26, 2019  Content Version: 12.2  © 7996-1211 [employer]. Care instructions adapted under license by [employer] (which disclaims liability or warranty for this information). If you have questions about a medical condition or this instruction, always ask your healthcare professional. [address] any warranty or liability for your use of this information.

## 2020-10-11 ENCOUNTER — HOSPITAL ENCOUNTER (EMERGENCY)
Age: 25
Discharge: HOME | End: 2020-10-12
Attending: EMERGENCY MEDICINE

## 2020-10-11 VITALS
WEIGHT: 267.8 LBS | HEART RATE: 110 BPM | RESPIRATION RATE: 20 BRPM | SYSTOLIC BLOOD PRESSURE: 154 MMHG | TEMPERATURE: 99.7 F | DIASTOLIC BLOOD PRESSURE: 93 MMHG | HEIGHT: 64 IN | BODY MASS INDEX: 45.72 KG/M2 | OXYGEN SATURATION: 98 %

## 2020-10-11 DIAGNOSIS — H66.001 ACUTE SUPPURATIVE OTITIS MEDIA OF RIGHT EAR WITHOUT SPONTANEOUS RUPTURE OF TYMPANIC MEMBRANE, RECURRENCE NOT SPECIFIED: ICD-10-CM

## 2020-10-11 DIAGNOSIS — J02.9 PHARYNGITIS, UNSPECIFIED ETIOLOGY: Primary | ICD-10-CM

## 2020-10-11 PROCEDURE — 74011250637 HC RX REV CODE- 250/637: Performed by: EMERGENCY MEDICINE

## 2020-10-11 PROCEDURE — 99284 EMERGENCY DEPT VISIT MOD MDM: CPT

## 2020-10-11 RX ORDER — IBUPROFEN 600 MG/1
600 TABLET ORAL
Status: COMPLETED | OUTPATIENT
Start: 2020-10-11 | End: 2020-10-11

## 2020-10-11 RX ORDER — PENICILLIN V POTASSIUM 250 MG/1
500 TABLET, FILM COATED ORAL
Status: COMPLETED | OUTPATIENT
Start: 2020-10-11 | End: 2020-10-11

## 2020-10-11 RX ORDER — IBUPROFEN 600 MG/1
600 TABLET ORAL
Qty: 18 TAB | Refills: 0 | OUTPATIENT
Start: 2020-10-11 | End: 2020-10-15

## 2020-10-11 RX ORDER — PENICILLIN V POTASSIUM 500 MG/1
500 TABLET, FILM COATED ORAL 3 TIMES DAILY
Qty: 21 TAB | Refills: 0 | Status: SHIPPED | OUTPATIENT
Start: 2020-10-11 | End: 2020-10-18

## 2020-10-11 RX ADMIN — IBUPROFEN 600 MG: 600 TABLET, FILM COATED ORAL at 23:54

## 2020-10-11 RX ADMIN — PENICILLIN V POTASIUM 500 MG: 250 TABLET ORAL at 23:54

## 2020-10-11 NOTE — LETTER
NOTIFICATION RETURN TO WORK / SCHOOL 
 
10/11/2020 11:52 PM 
 
Ms. Travis Cortes [address] [place] [id number] To Whom It May Concern: 
 
Travis Cortes is currently under the care of [id number] [place] [address] EMERGENCY DEPT. She will return to work/school on: 10/15/20 If there are questions or concerns please have the patient contact our office.  
 
 
 
Sincerely, 
 
 
Christ Cranker, MD

## 2020-10-12 NOTE — ED PROVIDER NOTES
Pt c/o rt ear pain and sore throat x one day. Hurts to swallow but able to swallow wout difficulty. No fever. No cough. No sick contacts. No cp or abd pain. No nausea. Tried throat lozenge per pt, no change, no other meds pta. Sx's moderate. No chance of curr pregnancy per pt. Past Medical History:   Diagnosis Date    Asthma     Obesity, Class III, BMI 40-49.9 (morbid obesity) (HCC)     PCOS (polycystic ovarian syndrome)        Past Surgical History:   Procedure Laterality Date    HX CHOLECYSTECTOMY      HX ORTHOPAEDIC  7years old    186 stitches in arm (trauma), general anesthesia used    REMOVAL GALLBLADDER N/A 12/22/2016    Dr. Todd Broderickore         Family History:   Problem Relation Age of Onset    Arthritis-osteo Mother         throughout her spine    Glaucoma Maternal Grandmother     COPD Maternal Grandmother     Kidney Disease Maternal Grandfather     Heart Disease Maternal Grandfather     Hypertension Paternal Grandmother     Hypertension Paternal Grandfather     Diabetes Paternal Grandfather     Cancer Maternal Aunt         thyroid    Other Maternal Aunt         \"tumors in uterus that weighed 48 lbs\"    Breast Cancer Maternal Aunt        Social History     Socioeconomic History    Marital status: SINGLE     Spouse name: Not on file    Number of children: Not on file    Years of education: Not on file    Highest education level: Not on file   Occupational History    Not on file   Social Needs    Financial resource strain: Not on file    Food insecurity     Worry: Not on file     Inability: Not on file    Transportation needs     Medical: Not on file     Non-medical: Not on file   Tobacco Use    Smoking status: Never Smoker    Smokeless tobacco: Never Used   Substance and Sexual Activity    Alcohol use:  Yes     Alcohol/week: 1.0 standard drinks     Types: 1 Glasses of wine per week     Comment: 5-6 times per year    Drug use: No    Sexual activity: Not on file Comment: never has been sexually active   Lifestyle    Physical activity     Days per week: Not on file     Minutes per session: Not on file    Stress: Not on file   Relationships    Social connections     Talks on phone: Not on file     Gets together: Not on file     Attends Zoroastrian service: Not on file     Active member of club or organization: Not on file     Attends meetings of clubs or organizations: Not on file     Relationship status: Not on file    Intimate partner violence     Fear of current or ex partner: Not on file     Emotionally abused: Not on file     Physically abused: Not on file     Forced sexual activity: Not on file   Other Topics Concern    Not on file   Social History Narrative    Not on file         ALLERGIES: Patient has no known allergies. Review of Systems   Constitutional: Negative for fever. HENT: Positive for ear pain and sore throat. Negative for congestion. Respiratory: Negative for cough and shortness of breath. Cardiovascular: Negative for chest pain. Gastrointestinal: Negative for abdominal pain and vomiting. Musculoskeletal: Negative for back pain. Skin: Negative for rash. Neurological: Negative for light-headedness. All other systems reviewed and are negative. Vitals:    10/11/20 2325   BP: (!) 154/93   Pulse: (!) 110   Resp: 20   Temp: 99.7 °F (37.6 °C)   SpO2: 98%   Weight: 121.5 kg (267 lb 12.8 oz)   Height: 5' 4\" (1.626 m)            Physical Exam  Vitals signs and nursing note reviewed. Constitutional:       Appearance: She is well-developed. She is not diaphoretic. HENT:      Head: Normocephalic and atraumatic. Ears:      Comments: Mild rt tm erythema, bulging, but intact     Mouth/Throat:      Mouth: No oral lesions. Pharynx: Oropharyngeal exudate and posterior oropharyngeal erythema present. No pharyngeal swelling or uvula swelling. Eyes:      Pupils: Pupils are equal, round, and reactive to light.    Neck: Musculoskeletal: Normal range of motion. Cardiovascular:      Rate and Rhythm: Normal rate and regular rhythm. Heart sounds: No murmur. Pulmonary:      Effort: Pulmonary effort is normal.      Breath sounds: No wheezing. Abdominal:      Palpations: Abdomen is soft. Tenderness: There is no abdominal tenderness. Musculoskeletal:         General: No tenderness. Skin:     General: Skin is dry. Capillary Refill: Capillary refill takes less than 2 seconds. Findings: No rash. Neurological:      Mental Status: She is alert and oriented to person, place, and time. MDM       Procedures    Vitals:  No data found. Medications ordered:   Medications   penicillin v potassium (VEETID) tablet 500 mg (500 mg Oral Given 10/11/20 2354)   ibuprofen (MOTRIN) tablet 600 mg (600 mg Oral Given 10/11/20 2354)         Lab findings:  No results found for this or any previous visit (from the past 12 hour(s)). X-Ray, CT or other radiology findings or impressions:  No orders to display       Progress notes, Consult notes or additional Procedure notes:   Alert, non-toxic, no emc. Stable for dc and close f/u. Pt verb und of imp of close f/u and detailed ret inst given. Pt to self quarantine until sx's resolved. Diagnosis:   1. Pharyngitis, unspecified etiology    2.  Acute suppurative otitis media of right ear without spontaneous rupture of tympanic membrane, recurrence not specified        Disposition: home    Follow-up Information     Follow up With Specialties Details Why [address]  Schedule an appointment as soon as possible for a visit in 2 days  [address]  [address] [address]  [facility] EMERGENCY DEPT Emergency Medicine Go to As needed, If symptoms worsen [address] [facility]  [phone]           Discharge Medication List as of 10/11/2020 11:52 PM      START taking these medications Details   ibuprofen (MOTRIN) 600 mg tablet Take 1 Tab by mouth every six (6) hours as needed for Pain., Normal, Disp-18 Tab,R-0      penicillin v potassium (VEETID) 500 mg tablet Take 1 Tab by mouth three (3) times daily for 7 days. , Print, Disp-21 Tab,R-0

## 2020-10-12 NOTE — DISCHARGE INSTRUCTIONS
Return for pain, any difficulty swallowing, fever not resolving with motrin or tylenol, shortness of breath, vomiting, decreased fluid intake, weakness, numbness, dizziness, or any change or concerns. Patient Education        Ear Infection (Otitis Media): Care Instructions  Overview     An ear infection may start with a cold and affect the middle ear (otitis media). It can hurt a lot. Most ear infections clear up on their own in a couple of days and do not need antibiotics. Also, antibiotics do not work against viruses, which may be the cause of your infection. Regular doses of pain relievers are the best way to reduce your fever and help you feel better. Follow-up care is a key part of your treatment and safety. Be sure to make and go to all appointments, and call your doctor if you are having problems. It's also a good idea to know your test results and keep a list of the medicines you take. How can you care for yourself at home? · Take pain medicines exactly as directed. ? If the doctor gave you a prescription medicine for pain, take it as prescribed. ? If you are not taking a prescription pain medicine, take an over-the-counter medicine, such as acetaminophen (Tylenol), ibuprofen (Advil, Motrin), or naproxen (Aleve). Read and follow all instructions on the label. ? Do not take two or more pain medicines at the same time unless the doctor told you to. Many pain medicines have acetaminophen, which is Tylenol. Too much acetaminophen (Tylenol) can be harmful. · Plan to take a full dose of pain reliever before bedtime. Getting enough sleep will help you get better. · Try a warm, moist washcloth on the ear. It may help relieve pain. · If your doctor prescribed antibiotics, take them as directed. Do not stop taking them just because you feel better. You need to take the full course of antibiotics. When should you call for help?    Call your doctor now or seek immediate medical care if:    · You have new or increasing ear pain.     · You have new or increasing pus or blood draining from your ear.     · You have a fever with a stiff neck or a severe headache. Watch closely for changes in your health, and be sure to contact your doctor if:    · You have new or worse symptoms.     · You are not getting better after taking an antibiotic for 2 days. Where can you learn more? Go to http://www.gray.com/  Enter [id number] in the search box to learn more about \"Ear Infection (Otitis Media): Care Instructions. \"  Current as of: April 15, 2020               Content Version: 12.6  © 5408-8654 [employer]. Care instructions adapted under license by [employer] (which disclaims liability or warranty for this information). If you have questions about a medical condition or this instruction, always ask your healthcare professional. [address] any warranty or liability for your use of this information.

## 2020-10-15 ENCOUNTER — HOSPITAL ENCOUNTER (EMERGENCY)
Age: 25
Discharge: HOME | End: 2020-10-15
Attending: EMERGENCY MEDICINE

## 2020-10-15 VITALS
SYSTOLIC BLOOD PRESSURE: 157 MMHG | RESPIRATION RATE: 18 BRPM | DIASTOLIC BLOOD PRESSURE: 96 MMHG | TEMPERATURE: 99.4 F | HEIGHT: 64 IN | HEART RATE: 101 BPM | WEIGHT: 267.4 LBS | OXYGEN SATURATION: 98 % | BODY MASS INDEX: 45.65 KG/M2

## 2020-10-15 DIAGNOSIS — J02.9 ACUTE PHARYNGITIS, UNSPECIFIED ETIOLOGY: Primary | ICD-10-CM

## 2020-10-15 PROCEDURE — 99282 EMERGENCY DEPT VISIT SF MDM: CPT

## 2020-10-15 RX ORDER — NAPROXEN 500 MG/1
500 TABLET ORAL 2 TIMES DAILY WITH MEALS
Qty: 20 TAB | Refills: 0 | Status: SHIPPED | OUTPATIENT
Start: 2020-10-15 | End: 2020-10-25

## 2020-10-15 NOTE — LETTER
[address] [place] [address] Dr [facility] EMERGENCY DEPT 
[address] [facility] [id number] [phone] Work/School Note Date: 10/15/2020 To Whom It May concern: 
 
Na Hinds was seen and treated today in the emergency room by the following provider(s): 
Attending Provider: Gerardo Gore MD.   
 
Na Hinds may return to work on October 19, 2020.  
 
Sincerely, 
 
 
 
 
Chandni Vieira MD

## 2020-10-15 NOTE — ED PROVIDER NOTES
HPI patient complains of a 5-day history of a sore throat. She was seen here 4 days ago and started on penicillin. She says she is been compliant with the penicillin medication but the symptoms have not changed. This morning she looked back in her throat and saw several vesicles and this caused her concern. She reports a subjective low-grade fever yesterday. 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 She denies any nausea vomiting chest pain abdominal pain or changes in bowel or bladder habits. No other complaints given at this time.     Past Medical History:   Diagnosis Date    Asthma     Obesity, Class III, BMI 40-49.9 (morbid obesity) (HCC)     PCOS (polycystic ovarian syndrome)        Past Surgical History:   Procedure Laterality Date    HX CHOLECYSTECTOMY      HX ORTHOPAEDIC  7years old    186 stitches in arm (trauma), general anesthesia used    REMOVAL GALLBLADDER N/A 12/22/2016    Dr. Syd Pickett         Family History:   Problem Relation Age of Onset    Arthritis-osteo Mother         throughout her spine    Glaucoma Maternal Grandmother     COPD Maternal Grandmother     Kidney Disease Maternal Grandfather     Heart Disease Maternal Grandfather     Hypertension Paternal Grandmother     Hypertension Paternal Grandfather     Diabetes Paternal Grandfather     Cancer Maternal Aunt         thyroid    Other Maternal Aunt         \"tumors in uterus that weighed 50 lbs\"    Breast Cancer Maternal Aunt        Social History     Socioeconomic History    Marital status: SINGLE     Spouse name: Not on file    Number of children: Not on file    Years of education: Not on file    Highest education level: Not on file   Occupational History    Not on file   Social Needs    Financial resource strain: Not on file    Food insecurity     Worry: Not on file     Inability: Not on file    Transportation needs     Medical: Not on file     Non-medical: Not on file Tobacco Use    Smoking status: Never Smoker    Smokeless tobacco: Never Used   Substance and Sexual Activity    Alcohol use: Yes     Alcohol/week: 1.0 standard drinks     Types: 1 Glasses of wine per week     Comment: 5-6 times per year    Drug use: No    Sexual activity: Not on file     Comment: never has been sexually active   Lifestyle    Physical activity     Days per week: Not on file     Minutes per session: Not on file    Stress: Not on file   Relationships    Social connections     Talks on phone: Not on file     Gets together: Not on file     Attends Nondenominational service: Not on file     Active member of club or organization: Not on file     Attends meetings of clubs or organizations: Not on file     Relationship status: Not on file    Intimate partner violence     Fear of current or ex partner: Not on file     Emotionally abused: Not on file     Physically abused: Not on file     Forced sexual activity: Not on file   Other Topics Concern    Not on file   Social History Narrative    Not on file         ALLERGIES: Patient has no known allergies. Review of Systems   Constitutional: Positive for fever. HENT: Positive for sore throat. Eyes: Negative. Respiratory: Negative. Cardiovascular: Negative. Gastrointestinal: Negative. Genitourinary: Negative. Musculoskeletal: Negative. Neurological: Negative. Psychiatric/Behavioral: Negative. Vitals:    10/15/20 0808   BP: (!) 157/96   Pulse: (!) 101   Resp: 18   Temp: 99.4 °F (37.4 °C)   SpO2: 98%   Weight: 121.3 kg (267 lb 6.4 oz)   Height: 5' 4\" (1.626 m)            Physical Exam  Vitals signs and nursing note reviewed. Constitutional:       Appearance: She is well-developed. HENT:      Head: Normocephalic and atraumatic. Mouth/Throat:      Mouth: Mucous membranes are moist.      Comments: Oropharynx is clear with normal mucosa. Tonsils are present bilaterally with a mild streaky exudate and 1 or 2 vesicles.   No overt pus or purulence no overt tonsillar swelling. No peritonsillar abscess. Eyes:      Conjunctiva/sclera: Conjunctivae normal.      Pupils: Pupils are equal, round, and reactive to light. Neck:      Musculoskeletal: Normal range of motion and neck supple. Cardiovascular:      Rate and Rhythm: Normal rate and regular rhythm. Pulmonary:      Effort: Pulmonary effort is normal.      Breath sounds: Normal breath sounds. Musculoskeletal: Normal range of motion. Skin:     General: Skin is warm and dry. Neurological:      Mental Status: She is alert and oriented to person, place, and time. MDM  Number of Diagnoses or Management Options  Diagnosis management comments: I spent time talking with the patient answering her questions and providing reassurance. I explained to her that clinically this appears consistent with a viral pharyngitis and unfortunately the antibiotics would not change her course. We will work on trying to get her more comfortable with her pain symptoms and give her a work note. Patient felt better after this interaction with physician and understands and agrees with the disposition and follow-up plan.   Cosmo Morocho MD 8:30 AM         Procedures

## 2020-10-15 NOTE — ED TRIAGE NOTES
Sore throat since Saturday night. States came here Sunday and placed on Penicillin. States Sx worse, now having blisters and fever yesterday of 101.

## 2020-10-15 NOTE — DISCHARGE INSTRUCTIONS

## 2021-06-01 ENCOUNTER — HOSPITAL ENCOUNTER (EMERGENCY)
Age: 26
Discharge: HOME | End: 2021-06-01
Attending: EMERGENCY MEDICINE

## 2021-06-01 ENCOUNTER — APPOINTMENT (OUTPATIENT)
Dept: GENERAL RADIOLOGY | Age: 26
End: 2021-06-01
Attending: EMERGENCY MEDICINE

## 2021-06-01 VITALS
TEMPERATURE: 98.9 F | WEIGHT: 262 LBS | SYSTOLIC BLOOD PRESSURE: 132 MMHG | HEIGHT: 64 IN | OXYGEN SATURATION: 98 % | BODY MASS INDEX: 44.73 KG/M2 | RESPIRATION RATE: 14 BRPM | HEART RATE: 97 BPM | DIASTOLIC BLOOD PRESSURE: 81 MMHG

## 2021-06-01 DIAGNOSIS — R73.9 HYPERGLYCEMIA: Primary | ICD-10-CM

## 2021-06-01 DIAGNOSIS — R07.9 CHEST PAIN, UNSPECIFIED TYPE: ICD-10-CM

## 2021-06-01 LAB
ANION GAP SERPL CALC-SCNC: 8 MMOL/L (ref 3–18)
BASOPHILS # BLD: 0.1 K/UL (ref 0–0.1)
BASOPHILS NFR BLD: 1 % (ref 0–2)
BUN SERPL-MCNC: 9 MG/DL (ref 7–18)
BUN/CREAT SERPL: 10 (ref 12–20)
CALCIUM SERPL-MCNC: 9.1 MG/DL (ref 8.5–10.1)
CHLORIDE SERPL-SCNC: 100 MMOL/L (ref 100–111)
CK MB CFR SERPL CALC: NORMAL % (ref 0–4)
CK MB SERPL-MCNC: <1 NG/ML (ref 5–25)
CK SERPL-CCNC: 65 U/L (ref 26–192)
CO2 SERPL-SCNC: 27 MMOL/L (ref 21–32)
CREAT SERPL-MCNC: 0.93 MG/DL (ref 0.6–1.3)
D DIMER PPP FEU-MCNC: <0.27 UG/ML(FEU)
DIFFERENTIAL METHOD BLD: ABNORMAL
EOSINOPHIL # BLD: 0.1 K/UL (ref 0–0.4)
EOSINOPHIL NFR BLD: 1 % (ref 0–5)
ERYTHROCYTE [DISTWIDTH] IN BLOOD BY AUTOMATED COUNT: 12.8 % (ref 11.6–14.5)
GLUCOSE BLD STRIP.AUTO-MCNC: 307 MG/DL (ref 70–110)
GLUCOSE SERPL-MCNC: 413 MG/DL (ref 74–99)
HCT VFR BLD AUTO: 46.4 % (ref 35–45)
HGB BLD-MCNC: 15.6 G/DL (ref 12–16)
LYMPHOCYTES # BLD: 2.2 K/UL (ref 0.9–3.6)
LYMPHOCYTES NFR BLD: 36 % (ref 21–52)
MCH RBC QN AUTO: 28.8 PG (ref 24–34)
MCHC RBC AUTO-ENTMCNC: 33.6 G/DL (ref 31–37)
MCV RBC AUTO: 85.6 FL (ref 74–97)
MONOCYTES # BLD: 0.4 K/UL (ref 0.05–1.2)
MONOCYTES NFR BLD: 6 % (ref 3–10)
NEUTS SEG # BLD: 3.2 K/UL (ref 1.8–8)
NEUTS SEG NFR BLD: 56 % (ref 40–73)
PLATELET # BLD AUTO: 176 K/UL (ref 135–420)
PLATELET COMMENTS,PCOM: ABNORMAL
PMV BLD AUTO: 13.4 FL (ref 9.2–11.8)
POTASSIUM SERPL-SCNC: 3.8 MMOL/L (ref 3.5–5.5)
RBC # BLD AUTO: 5.42 M/UL (ref 4.2–5.3)
RBC MORPH BLD: ABNORMAL
SODIUM SERPL-SCNC: 135 MMOL/L (ref 136–145)
TROPONIN I SERPL-MCNC: <0.02 NG/ML (ref 0–0.04)
WBC # BLD AUTO: 6 K/UL (ref 4.6–13.2)

## 2021-06-01 PROCEDURE — 85025 COMPLETE CBC W/AUTO DIFF WBC: CPT

## 2021-06-01 PROCEDURE — 82553 CREATINE MB FRACTION: CPT

## 2021-06-01 PROCEDURE — 82962 GLUCOSE BLOOD TEST: CPT

## 2021-06-01 PROCEDURE — 74011000250 HC RX REV CODE- 250: Performed by: PHYSICIAN ASSISTANT

## 2021-06-01 PROCEDURE — 96374 THER/PROPH/DIAG INJ IV PUSH: CPT

## 2021-06-01 PROCEDURE — 94640 AIRWAY INHALATION TREATMENT: CPT

## 2021-06-01 PROCEDURE — 74011250636 HC RX REV CODE- 250/636: Performed by: PHYSICIAN ASSISTANT

## 2021-06-01 PROCEDURE — 93005 ELECTROCARDIOGRAM TRACING: CPT

## 2021-06-01 PROCEDURE — 71046 X-RAY EXAM CHEST 2 VIEWS: CPT

## 2021-06-01 PROCEDURE — 99285 EMERGENCY DEPT VISIT HI MDM: CPT

## 2021-06-01 PROCEDURE — 85379 FIBRIN DEGRADATION QUANT: CPT

## 2021-06-01 PROCEDURE — 96361 HYDRATE IV INFUSION ADD-ON: CPT

## 2021-06-01 PROCEDURE — 74011636637 HC RX REV CODE- 636/637: Performed by: PHYSICIAN ASSISTANT

## 2021-06-01 PROCEDURE — 80048 BASIC METABOLIC PNL TOTAL CA: CPT

## 2021-06-01 RX ORDER — METFORMIN HYDROCHLORIDE 1000 MG/1
1000 TABLET ORAL 2 TIMES DAILY WITH MEALS
Qty: 20 TABLET | Refills: 0 | Status: SHIPPED | OUTPATIENT
Start: 2021-06-01 | End: 2021-06-11

## 2021-06-01 RX ORDER — IPRATROPIUM BROMIDE AND ALBUTEROL SULFATE 2.5; .5 MG/3ML; MG/3ML
3 SOLUTION RESPIRATORY (INHALATION)
Status: COMPLETED | OUTPATIENT
Start: 2021-06-01 | End: 2021-06-01

## 2021-06-01 RX ORDER — ALBUTEROL SULFATE 0.83 MG/ML
SOLUTION RESPIRATORY (INHALATION)
COMMUNITY
End: 2021-07-18

## 2021-06-01 RX ORDER — ALBUTEROL SULFATE 90 UG/1
AEROSOL, METERED RESPIRATORY (INHALATION)
COMMUNITY
End: 2021-07-18

## 2021-06-01 RX ADMIN — INSULIN HUMAN 5 UNITS: 100 INJECTION, SOLUTION PARENTERAL at 16:01

## 2021-06-01 RX ADMIN — SODIUM CHLORIDE 1000 ML: 900 INJECTION, SOLUTION INTRAVENOUS at 15:32

## 2021-06-01 RX ADMIN — IPRATROPIUM BROMIDE AND ALBUTEROL SULFATE 3 ML: .5; 3 SOLUTION RESPIRATORY (INHALATION) at 15:31

## 2021-06-01 NOTE — Clinical Note
[address] [place] [address] Dr [facility] EMERGENCY DEPT 
[address] [facility] [address] [phone] Work/School Note Date: 6/1/2021 To Whom It May concern: 
 
Max Arriola was seen and treated today in the emergency room by the following provider(s): 
Attending Provider: Louann Reyes DO Physician Assistant: PILAR Stanley. Max Arriola is excused from work/school on 06/01/21 and 06/02/21. She is medically clear to return to work/school on 6/3/2021. Sincerely, PILAR Centeno

## 2021-06-01 NOTE — ED PROVIDER NOTES
EMERGENCY DEPARTMENT HISTORY AND PHYSICAL EXAM      Date: 6/1/2021  Patient Name: Mikaela Hunt    History of Presenting Illness     Chief Complaint   Patient presents with    Shortness of Breath    Asthma       History Provided By: Patient    HPI: Mikaela Hunt, 23 y.o. female PMHx significant for asthma, PCOS, DM presents ambulatory to the ED with cc of intermittent chest tightness and SOB that began last night. Pt adamant that she is not experiencing chest pain but more chest tightness. Patient reports she has been using inhaler and nebulizer without relief of symptoms. Denies lower leg pain or swelling. Denies cough, congestion, fever/chills. Denies cardiac history. Denies change in symptoms with activity. There are no other complaints, changes, or physical findings at this time. PCP: None    No current facility-administered medications on file prior to encounter.      Current Outpatient Medications on File Prior to Encounter   Medication Sig Dispense Refill    albuterol (Ventolin HFA) 90 mcg/actuation inhaler Ventolin HFA 90 mcg/actuation aerosol inhaler      albuterol (PROVENTIL VENTOLIN) 2.5 mg /3 mL (0.083 %) nebu albuterol sulfate 2.5 mg/3 mL (0.083 %) solution for nebulization         Past History     Past Medical History:  Past Medical History:   Diagnosis Date    Asthma     Diabetes ([facility] [address]     Obesity, Class III, BMI 40-49.9 (morbid obesity) (HCC)     PCOS (polycystic ovarian syndrome)     UTI (urinary tract infection)        Past Surgical History:  Past Surgical History:   Procedure Laterality Date    HX CHOLECYSTECTOMY      HX ORTHOPAEDIC  7years old    186 stitches in arm (trauma), general anesthesia used    [place] REMOVAL GALLBLADDER N/A 12/22/2016    Dr. Cheyanne Blas       Family History:  Family History   Problem Relation Age of Onset    Arthritis-osteo Mother         throughout her spine    Glaucoma Maternal Grandmother     COPD Maternal Grandmother     Kidney Disease Maternal Grandfather     Heart Disease Maternal Grandfather     Hypertension Paternal Grandmother     Hypertension Paternal Grandfather     Diabetes Paternal Grandfather     Cancer Maternal Aunt         thyroid    Other Maternal Aunt         \"tumors in uterus that weighed 50 lbs\"    Breast Cancer Maternal Aunt        Social History:  Social History     Tobacco Use    Smoking status: Never Smoker    Smokeless tobacco: Never Used   Substance Use Topics    Alcohol use: Yes     Alcohol/week: 1.0 standard drinks     Types: 1 Glasses of wine per week     Comment: 5-6 times per year    Drug use: No       Allergies:  No Known Allergies      Review of Systems   Review of Systems   Constitutional: Negative for chills and fever. Respiratory: Positive for shortness of breath. Cardiovascular: Positive for chest pain (chest tightness). Gastrointestinal: Negative for abdominal pain, nausea and vomiting. Genitourinary: Negative for flank pain. Musculoskeletal: Negative for back pain and myalgias. Skin: Negative for color change, pallor, rash and wound. Neurological: Negative for dizziness, weakness and light-headedness. All other systems reviewed and are negative. Physical Exam   Physical Exam  Vitals and nursing note reviewed. Constitutional:       General: She is not in acute distress. Appearance: She is well-developed. Comments: Pt in NAD   HENT:      Head: Normocephalic and atraumatic. Eyes:      Conjunctiva/sclera: Conjunctivae normal.   Cardiovascular:      Rate and Rhythm: Normal rate and regular rhythm. Heart sounds: Normal heart sounds. Comments: No murmurs rubs or gallops  No lower leg swelling b/l  Pulmonary:      Effort: Pulmonary effort is normal. No respiratory distress. Breath sounds: Normal breath sounds. Comments: Lungs CTA  Not working to breathe  Abdominal:      General: Bowel sounds are normal. There is no distension.       Palpations: Abdomen is soft.   Musculoskeletal:         General: Normal range of motion. Skin:     General: Skin is warm. Findings: No rash. Neurological:      Mental Status: She is alert and oriented to person, place, and time. Psychiatric:         Behavior: Behavior normal.         Diagnostic Study Results     Labs -     Recent Results (from the past 12 hour(s))   CARDIAC PANEL,(CK, CKMB & TROPONIN)    Collection Time: 06/01/21  2:24 PM   Result Value Ref Range    CK - MB <1.0 <3.6 ng/ml    CK-MB Index  0.0 - 4.0 %     CALCULATION NOT PERFORMED WHEN RESULT IS BELOW LINEAR LIMIT    CK 65 26 - 192 U/L    Troponin-I, QT <0.02 0.0 - 0.045 NG/ML   CBC WITH AUTOMATED DIFF    Collection Time: 06/01/21  2:24 PM   Result Value Ref Range    WBC 6.0 4.6 - 13.2 K/uL    RBC 5.42 (H) 4.20 - 5.30 M/uL    HGB 15.6 12.0 - 16.0 g/dL    HCT 46.4 (H) 35.0 - 45.0 %    MCV 85.6 74.0 - 97.0 FL    MCH 28.8 24.0 - 34.0 PG    MCHC 33.6 31.0 - 37.0 g/dL    RDW 12.8 11.6 - 14.5 %    PLATELET 558 667 - 559 K/uL    MPV 13.4 (H) 9.2 - 11.8 FL    NEUTROPHILS 56 40 - 73 %    LYMPHOCYTES 36 21 - 52 %    MONOCYTES 6 3 - 10 %    EOSINOPHILS 1 0 - 5 %    BASOPHILS 1 0 - 2 %    ABS. NEUTROPHILS 3.2 1.8 - 8.0 K/UL    ABS. LYMPHOCYTES 2.2 0.9 - 3.6 K/UL    ABS. MONOCYTES 0.4 0.05 - 1.2 K/UL    ABS. EOSINOPHILS 0.1 0.0 - 0.4 K/UL    ABS.  BASOPHILS 0.1 0.0 - 0.1 K/UL    DF SMEAR SCANNED      PLATELET COMMENTS ADEQUATE PLATELETS      RBC COMMENTS NORMOCYTIC, NORMOCHROMIC     METABOLIC PANEL, BASIC    Collection Time: 06/01/21  2:24 PM   Result Value Ref Range    Sodium 135 (L) 136 - 145 mmol/L    Potassium 3.8 3.5 - 5.5 mmol/L    Chloride 100 100 - 111 mmol/L    CO2 27 21 - 32 mmol/L    Anion gap 8 3.0 - 18 mmol/L    Glucose 413 (HH) 74 - 99 mg/dL    BUN 9 7.0 - 18 MG/DL    Creatinine 0.93 0.6 - 1.3 MG/DL    BUN/Creatinine ratio 10 (L) 12 - 20      GFR est AA >60 >60 ml/min/1.73m2    GFR est non-AA >60 >60 ml/min/1.73m2    Calcium 9.1 8.5 - 10.1 MG/DL   D DIMER Collection Time: 06/01/21  2:24 PM   Result Value Ref Range    D DIMER <0.27 <0.46 ug/ml(FEU)   GLUCOSE, POC    Collection Time: 06/01/21  5:21 PM   Result Value Ref Range    Glucose (POC) 307 (H) 70 - 110 mg/dL       Radiologic Studies -   XR CHEST PA LAT   Final Result      No acute radiographic findings. CT Results  (Last 48 hours)    None        CXR Results  (Last 48 hours)               06/01/21 1455  XR CHEST PA LAT Final result    Impression:      No acute radiographic findings. Narrative:  EXAMINATION: Chest 2 views       INDICATION: Chest tightness       COMPARISON: None       FINDINGS: Frontal and lateral views of the chest obtained. Mediastinal   silhouette and pulmonary vasculature unremarkable. No consolidation. No evidence   of pneumothorax. No acute osseous findings. Medical Decision Making   I am the first provider for this patient. I reviewed the vital signs, available nursing notes, past medical history, past surgical history, family history and social history. Vital Signs-Reviewed the patient's vital signs. Patient Vitals for the past 12 hrs:   Temp Pulse Resp BP SpO2   06/01/21 1630  97 14 132/81 98 %   06/01/21 1600  (!) 103 18 136/74 96 %   06/01/21 1526  (!) 110 21  99 %   06/01/21 1430 98.9 °F (37.2 °C) (!) 118 18 (!) 154/103 98 %         EKG interpretation: (Preliminary)  Rhythm: sinus tachycardia; and regular . Rate (approx.): 116; Axis: normal; LA interval: normal; QRS interval: normal ; ST/T wave: non-specific changes. No acute ischemic changes. Records Reviewed: Nursing Notes and Old Medical Records    Provider Notes (Medical Decision Making):   DDx: ACS, PNA, PE, Hyperglycemia    22 yo F who presents with intermittent chest tightness and SOB that began last night. Denies cardiac history. On exam no murmurs rubs or gallops, no lower leg swelling. Lungs CTA and not working to breathe.   EKG shows no acute ischemic changes with negative troponin. HEART score of 2. Level of concern for ACS. CXR shows not acute infectious process. Wells score 0 with negative D-dimer. Low level of concern for PE. Sx possibly related to hyperglycemia. Patient not in DKA. Increased Metformin dose and stressed importance of prompt PCP follow-up for continued management and evaluation. Unsure of reason for hyperglycemia but not likely due to infection due to lack of leukocytosis or left shift. At time of discharge patient nontoxic-appearing in NAD. Patient stable for prompt outpatient follow-up with PCP 1 to 2 days. Patient given strict instructions to return if symptoms worsen. ED Course:   Initial assessment performed. The patients presenting problems have been discussed, and they are in agreement with the care plan formulated and outlined with them. I have encouraged them to ask questions as they arise throughout their visit. Discussed elevated glucose with patient. Patient reports taking Metformin 500 mg 3 times daily as prescribed by her GYN. Does not check her blood sugars regularly. Patient denies recent steroid use. 1718  Pt reports SOB has resolved with improvement of glucose. Disposition:  Discussed lab and imaging results with pt along with dx and treatment plan. Discussed importance of PCP follow up. All questions answered. Pt voiced they understood. Return if sx worsen. PLAN:  1. Discharge Medication List as of 6/1/2021  5:42 PM        2. Follow-up Information     Follow up With Specialties Details Why 1200 Located within [facility]  Schedule an appointment as soon as possible for a visit in 1 day  [address] [place] [address]  [address] [place] [address]    [id number] [place] [address] EMERGENCY DEPT Emergency Medicine  As needed, If symptoms worsen [address] [place] [address]  [phone]        Return to ED if worse     Diagnosis     Clinical Impression:   1. Hyperglycemia    2.  Chest pain, unspecified type        Attestations:    PILAR Gudino    Please note that this dictation was completed with "[employer] the computer voice recognition software. Quite often unanticipated grammatical, syntax, homophones, and other interpretive errors are inadvertently transcribed by the computer software. Please disregard these errors. Please excuse any errors that have escaped final proofreading. Thank you.

## 2021-06-02 LAB
ATRIAL RATE: 116 BPM
CALCULATED P AXIS, ECG09: 64 DEGREES
CALCULATED R AXIS, ECG10: 29 DEGREES
CALCULATED T AXIS, ECG11: -11 DEGREES
DIAGNOSIS, 93000: NORMAL
P-R INTERVAL, ECG05: 126 MS
Q-T INTERVAL, ECG07: 314 MS
QRS DURATION, ECG06: 86 MS
QTC CALCULATION (BEZET), ECG08: 436 MS
VENTRICULAR RATE, ECG03: 116 BPM

## 2021-07-18 ENCOUNTER — HOSPITAL ENCOUNTER (EMERGENCY)
Age: 26
Discharge: HOME | End: 2021-07-18
Attending: EMERGENCY MEDICINE

## 2021-07-18 VITALS
RESPIRATION RATE: 16 BRPM | HEART RATE: 94 BPM | BODY MASS INDEX: 44.9 KG/M2 | TEMPERATURE: 98.6 F | WEIGHT: 263 LBS | DIASTOLIC BLOOD PRESSURE: 83 MMHG | OXYGEN SATURATION: 99 % | SYSTOLIC BLOOD PRESSURE: 145 MMHG | HEIGHT: 64 IN

## 2021-07-18 DIAGNOSIS — N39.0 URINARY TRACT INFECTION WITHOUT HEMATURIA, SITE UNSPECIFIED: Primary | ICD-10-CM

## 2021-07-18 LAB
AMORPH CRY URNS QL MICRO: ABNORMAL
APPEARANCE UR: ABNORMAL
BACTERIA URNS QL MICRO: ABNORMAL /HPF
BILIRUB UR QL: NEGATIVE
COLOR UR: ABNORMAL
EPITH CASTS URNS QL MICRO: ABNORMAL /LPF (ref 0–5)
GLUCOSE UR STRIP.AUTO-MCNC: >1000 MG/DL
HCG UR QL: NEGATIVE
HGB UR QL STRIP: ABNORMAL
KETONES UR QL STRIP.AUTO: NEGATIVE MG/DL
LEUKOCYTE ESTERASE UR QL STRIP.AUTO: ABNORMAL
MUCOUS THREADS URNS QL MICRO: ABNORMAL /LPF
NITRITE UR QL STRIP.AUTO: POSITIVE
PH UR STRIP: 6.5 [PH] (ref 5–8)
PROT UR STRIP-MCNC: 300 MG/DL
RBC #/AREA URNS HPF: ABNORMAL /HPF (ref 0–5)
SP GR UR REFRACTOMETRY: >1.03 (ref 1–1.03)
UROBILINOGEN UR QL STRIP.AUTO: 1 EU/DL (ref 0.2–1)
WBC URNS QL MICRO: ABNORMAL /HPF (ref 0–4)

## 2021-07-18 PROCEDURE — 81025 URINE PREGNANCY TEST: CPT

## 2021-07-18 PROCEDURE — 81001 URINALYSIS AUTO W/SCOPE: CPT

## 2021-07-18 PROCEDURE — 99284 EMERGENCY DEPT VISIT MOD MDM: CPT

## 2021-07-18 RX ORDER — NITROFURANTOIN 25; 75 MG/1; MG/1
100 CAPSULE ORAL 2 TIMES DAILY
Qty: 10 CAPSULE | Refills: 0 | Status: SHIPPED | OUTPATIENT
Start: 2021-07-18 | End: 2021-07-23

## 2021-07-18 RX ORDER — METFORMIN HYDROCHLORIDE 500 MG/1
500 TABLET ORAL
Status: ON HOLD | COMMUNITY
End: 2021-09-24

## 2021-07-18 NOTE — ED PROVIDER NOTES
EMERGENCY DEPARTMENT HISTORY AND PHYSICAL EXAM    12:11 PM  Date: 7/18/2021  Patient Name: Willian Gross    History of Presenting Illness       History Provided By:     HPI: Willian Gross is a 23 y.o. female with past medical history of diabetes presents with dysuria. Patient denies any nausea vomiting, fever chills. Patient denies any back pain, abdominal pain. Patient denies any vaginal discharge. Last menstrual period Melody 15. PCP: None    Past History     Past Medical History:  Past Medical History:   Diagnosis Date    Asthma     Chest pain     Diabetes ([address]     Obesity, Class III, BMI 40-49.9 (morbid obesity) (HCC)     PCOS (polycystic ovarian syndrome)     UTI (urinary tract infection)        Past Surgical History:  Past Surgical History:   Procedure Laterality Date    HX CHOLECYSTECTOMY      HX ORTHOPAEDIC  7years old    186 stitches in arm (trauma), general anesthesia used    [place] REMOVAL GALLBLADDER N/A 12/22/2016    Dr. Guerline Corona       Family History:  Family History   Problem Relation Age of Onset    Arthritis-osteo Mother         throughout her spine    Glaucoma Maternal Grandmother     COPD Maternal Grandmother     Kidney Disease Maternal Grandfather     Heart Disease Maternal Grandfather     Hypertension Paternal Grandmother     Hypertension Paternal Grandfather     Diabetes Paternal Grandfather     Cancer Maternal Aunt         thyroid    Other Maternal Aunt         \"tumors in uterus that weighed 50 lbs\"    Breast Cancer Maternal Aunt        Social History:  Social History     Tobacco Use    Smoking status: Never Smoker    Smokeless tobacco: Never Used   Substance Use Topics    Alcohol use:  Yes     Alcohol/week: 1.0 standard drinks     Types: 1 Glasses of wine per week     Comment: 5-6 times per year    Drug use: No       Allergies:  No Known Allergies    Review of Systems   Review of Systems   Constitutional: Negative for activity change, appetite change and chills. HENT: Negative for congestion, ear discharge, ear pain and sore throat. Eyes: Negative for photophobia and pain. Respiratory: Negative for cough and choking. Cardiovascular: Negative for palpitations and leg swelling. Gastrointestinal: Negative for anal bleeding and rectal pain. Endocrine: Negative for polydipsia and polyuria. Genitourinary: Positive for dysuria. Negative for genital sores and urgency. Musculoskeletal: Negative for arthralgias and myalgias. Neurological: Negative for dizziness, seizures and speech difficulty. Psychiatric/Behavioral: Negative for hallucinations, self-injury and suicidal ideas. Physical Exam     Patient Vitals for the past 12 hrs:   Temp Pulse Resp BP SpO2   07/18/21 1110 98.6 °F (37 °C) 94 16 (!) 145/83 99 %       Physical Exam  Vitals and nursing note reviewed. Constitutional:       Appearance: She is well-developed. HENT:      Head: Normocephalic and atraumatic. Eyes:      General:         Right eye: No discharge. Left eye: No discharge. Cardiovascular:      Rate and Rhythm: Normal rate and regular rhythm. Heart sounds: Normal heart sounds. No murmur heard. Pulmonary:      Effort: Pulmonary effort is normal. No respiratory distress. Breath sounds: Normal breath sounds. No stridor. No wheezing or rales. Chest:      Chest wall: No tenderness. Abdominal:      General: Bowel sounds are normal. There is no distension. Palpations: Abdomen is soft. Tenderness: There is no abdominal tenderness. There is no right CVA tenderness, left CVA tenderness, guarding or rebound. Musculoskeletal:         General: Normal range of motion. Cervical back: Normal range of motion and neck supple. Skin:     General: Skin is warm and dry. Neurological:      Mental Status: She is alert and oriented to person, place, and time.          Diagnostic Study Results     Labs -  Recent Results (from the past 12 hour(s)) URINALYSIS W/ RFLX MICROSCOPIC    Collection Time: 07/18/21 11:15 AM   Result Value Ref Range    Color KAREN      Appearance TURBID      Specific gravity >1.030 (H) 1.005 - 1.030    pH (UA) 6.5 5.0 - 8.0      Protein 300 (A) NEG mg/dL    Glucose >1,000 (A) NEG mg/dL    Ketone Negative NEG mg/dL    Bilirubin Negative NEG      Blood LARGE (A) NEG      Urobilinogen 1.0 0.2 - 1.0 EU/dL    Nitrites Positive (A) NEG      Leukocyte Esterase MODERATE (A) NEG     HCG URINE, QL    Collection Time: 07/18/21 11:15 AM   Result Value Ref Range    HCG urine, QL Negative NEG     URINE MICROSCOPIC ONLY    Collection Time: 07/18/21 11:15 AM   Result Value Ref Range    WBC 11 to 20 0 - 4 /hpf    RBC TOO NUMEROUS TO COUNT 0 - 5 /hpf    Epithelial cells FEW 0 - 5 /lpf    Bacteria 1+ (A) NEG /hpf    Mucus FEW (A) NEG /lpf    Amorphous Crystals 1+ (A) NEG       Radiologic Studies -   No results found. Medical Decision Making     ED Course: Progress Notes, Reevaluation, and Consults:    12:11 PM Initial assessment performed. The patients presenting problems have been discussed, and they/their family are in agreement with the care plan formulated and outlined with them. I have encouraged them to ask questions as they arise throughout their visit. Provider Notes (Medical Decision Making):   Patient presents with dysuria  UA positive for UTI  Patient will be sent home with antibiotics  Advised follow-up with PMD.  Return precautions given if abdominal pain back pain fever chills                Vital Signs-Reviewed the patient's vital signs. Reviewed pt's pulse ox reading. Records Reviewed: old medical records  -I am the first provider for this patient.  -I reviewed the vital signs, available nursing notes, past medical history, past surgical history, family history and social history.     Current Outpatient Medications   Medication Sig Dispense Refill    metFORMIN (GLUCOPHAGE) 500 mg tablet Take 500 mg by mouth two (2) times daily (with meals). Clinical Impression     Clinical Impression: No diagnosis found. Disposition: This note was dictated utilizing voice recognition software which may lead to typographical errors. I apologize in advance if the situation occurs. If questions arise please do not hesitate to contact me or call our department.     Kaylyn Rios MD  12:11 PM

## 2021-07-18 NOTE — ED TRIAGE NOTES
Patient states noticing sharp pain following end of urine stream. She states noticing pain in vagina. She denies vaginal discharge. Denies concerns for STD. States symptoms began yesterday.

## 2021-09-12 ENCOUNTER — HOSPITAL ENCOUNTER (EMERGENCY)
Age: 26
Discharge: HOME | End: 2021-09-12
Attending: EMERGENCY MEDICINE
Payer: OTHER GOVERNMENT

## 2021-09-12 VITALS
BODY MASS INDEX: 45.41 KG/M2 | HEART RATE: 91 BPM | SYSTOLIC BLOOD PRESSURE: 128 MMHG | TEMPERATURE: 99 F | DIASTOLIC BLOOD PRESSURE: 74 MMHG | HEIGHT: 64 IN | RESPIRATION RATE: 19 BRPM | OXYGEN SATURATION: 98 % | WEIGHT: 266 LBS

## 2021-09-12 DIAGNOSIS — Z20.822 SUSPECTED COVID-19 VIRUS INFECTION: Primary | ICD-10-CM

## 2021-09-12 LAB — SARS-COV-2, COV2: NORMAL

## 2021-09-12 PROCEDURE — 99282 EMERGENCY DEPT VISIT SF MDM: CPT

## 2021-09-12 PROCEDURE — U0005 INFEC AGEN DETEC AMPLI PROBE: HCPCS

## 2021-09-12 NOTE — ED PROVIDER NOTES
EMERGENCY DEPARTMENT HISTORY AND PHYSICAL EXAM    11:34 AM  Date: 9/12/2021  Patient Name: Shashank Mohamud    History of Presenting Illness       History Provided By:     HPI: Shashank Mohamud is a 23 y.o. female with past medical history of asthma, diabetes, PCOS presents with cough, sore throat, bilateral earache for 6 days. Patient denies any fever chills. Patient denies any nausea or vomiting. Patient denies any shortness of breath. PCP: None    Past History     Past Medical History:  Past Medical History:   Diagnosis Date    Asthma     Chest pain     Diabetes ([address]     Obesity, Class III, BMI 40-49.9 (morbid obesity) (HCC)     PCOS (polycystic ovarian syndrome)     UTI (urinary tract infection)        Past Surgical History:  Past Surgical History:   Procedure Laterality Date    HX CHOLECYSTECTOMY      HX ORTHOPAEDIC  7years old    186 stitches in arm (trauma), general anesthesia used    [place] REMOVAL GALLBLADDER N/A 12/22/2016    Dr. Maru Cohn       Family History:  Family History   Problem Relation Age of Onset    Arthritis-osteo Mother         throughout her spine    Glaucoma Maternal Grandmother     COPD Maternal Grandmother     Kidney Disease Maternal Grandfather     Heart Disease Maternal Grandfather     Hypertension Paternal Grandmother     Hypertension Paternal Grandfather     Diabetes Paternal Grandfather     Cancer Maternal Aunt         thyroid    Other Maternal Aunt         \"tumors in uterus that weighed 50 lbs\"    Breast Cancer Maternal Aunt        Social History:  Social History     Tobacco Use    Smoking status: Never Smoker    Smokeless tobacco: Never Used   Substance Use Topics    Alcohol use:  Yes     Alcohol/week: 1.0 standard drinks     Types: 1 Glasses of wine per week     Comment: 5-6 times per year    Drug use: No       Allergies:  No Known Allergies    Review of Systems   Review of Systems   Constitutional: Negative for activity change, appetite change and chills. HENT: Negative for congestion, ear discharge, ear pain and sore throat. Eyes: Negative for photophobia and pain. Respiratory: Negative for cough and choking. Cardiovascular: Negative for palpitations and leg swelling. Gastrointestinal: Negative for anal bleeding and rectal pain. Endocrine: Negative for polydipsia and polyuria. Genitourinary: Negative for genital sores and urgency. Musculoskeletal: Negative for arthralgias and myalgias. Neurological: Negative for dizziness, seizures and speech difficulty. Psychiatric/Behavioral: Negative for hallucinations, self-injury and suicidal ideas. Physical Exam     Patient Vitals for the past 12 hrs:   Temp Pulse Resp BP SpO2   09/12/21 1114 99 °F (37.2 °C) 91 19 128/74 98 %       Physical Exam  Vitals and nursing note reviewed. Constitutional:       Appearance: She is well-developed. HENT:      Head: Normocephalic and atraumatic. Right Ear: Tympanic membrane normal.      Left Ear: Tympanic membrane normal.      Mouth/Throat:      Mouth: Mucous membranes are moist.   Eyes:      General:         Right eye: No discharge. Left eye: No discharge. Cardiovascular:      Rate and Rhythm: Normal rate and regular rhythm. Heart sounds: Normal heart sounds. No murmur heard. Pulmonary:      Effort: Pulmonary effort is normal. No respiratory distress. Breath sounds: Normal breath sounds. No stridor. No wheezing or rales. Chest:      Chest wall: No tenderness. Abdominal:      General: Bowel sounds are normal. There is no distension. Palpations: Abdomen is soft. Tenderness: There is no abdominal tenderness. There is no guarding or rebound. Musculoskeletal:         General: Normal range of motion. Cervical back: Normal range of motion and neck supple. Skin:     General: Skin is warm and dry. Neurological:      Mental Status: She is alert and oriented to person, place, and time.          Diagnostic Study Results     Labs -  No results found for this or any previous visit (from the past 12 hour(s)). Radiologic Studies -   No results found. Medical Decision Making     ED Course: Progress Notes, Reevaluation, and Consults:    11:34 AM Initial assessment performed. The patients presenting problems have been discussed, and they/their family are in agreement with the care plan formulated and outlined with them. I have encouraged them to ask questions as they arise throughout their visit. Provider Notes (Medical Decision Making):   Patient presents with sore throat, congestion  Physical exam unremarkable  Will be tested for Covid  Patient advised to self quarantine before Covid results are back and check results on MyChart  Return precautions given            Vital Signs-Reviewed the patient's vital signs. Reviewed pt's pulse ox reading. Records Reviewed: old medical records  -I am the first provider for this patient.  -I reviewed the vital signs, available nursing notes, past medical history, past surgical history, family history and social history. Current Outpatient Medications   Medication Sig Dispense Refill    metFORMIN (GLUCOPHAGE) 500 mg tablet Take 500 mg by mouth two (2) times daily (with meals). Clinical Impression     Clinical Impression: No diagnosis found. Disposition:    Pt has been reexamined. Patient has no new complaints, changes, or physical findings. Care plan outlined and precautions discussed. Results were reviewed with the patient. All medications were reviewed with the patient; will d/c home with PMD f/u. All of pt's questions and concerns were addressed. Patient was instructed and agrees to follow up with PMD, as well as to return to the ED upon further deterioration. Patient is ready to go home. This note was dictated utilizing voice recognition software which may lead to typographical errors. I apologize in advance if the situation occurs.   If questions arise please do not hesitate to contact me or call our department. This note was dictated utilizing voice recognition software which may lead to typographical errors. I apologize in advance if the situation occurs. If questions arise please do not hesitate to contact me or call our department.     Corrie Drake MD  11:34 AM

## 2021-09-12 NOTE — ED NOTES
Patient c/o cough and left ear pain    Pt states she feels hot but no fevers at home.     With sore throat

## 2021-09-12 NOTE — Clinical Note
[address] [facility] EMERGENCY DEPT  [address] [address] [phone]    Work/School Note    Date: 9/12/2021     To Whom It May concern:    Qamar Dalton was evaulated by the following provider(s):  Attending Provider: Lacy Campbell MD.   Cynthia Pérez virus is suspected. Per the CDC guidelines we recommend home isolation until the following conditions are all met:    1. At least 10 days have passed since symptoms first appeared and  2. At least 24 hours have passed since last fever without the use of fever-reducing medications and  3.  Symptoms (e.g., cough, shortness of breath) have improved    Sincerely,          Jose E Diaz MD

## 2021-09-12 NOTE — Clinical Note
[address] [facility] EMERGENCY DEPT  [address] [id number] [phone]    Work/School Note    Date: 9/12/2021     To Whom It May concern:    Suhas Healy was evaulated by the following provider(s):  Attending Provider: Jasmeet Stark MD.   Christal Lazaros virus is suspected. Per the CDC guidelines we recommend home isolation until the following conditions are all met:    1. At least 10 days have passed since symptoms first appeared and  2. At least 24 hours have passed since last fever without the use of fever-reducing medications and  3.  Symptoms (e.g., cough, shortness of breath) have improved    Sincerely,          Jossy Us MD

## 2021-09-12 NOTE — Clinical Note
[address] [facility] EMERGENCY DEPT  [address] [address] [phone]    Work/School Note    Date: 9/12/2021     To Whom It May concern:    Willian Gross was evaulated by the following provider(s):  Attending Provider: iJmy Figueredo MD.   Haven Christelle virus is suspected. Per the CDC guidelines we recommend home isolation until the following conditions are all met:    1. At least 10 days have passed since symptoms first appeared and  2. At least 24 hours have passed since last fever without the use of fever-reducing medications and  3.  Symptoms (e.g., cough, shortness of breath) have improved    Sincerely,          Sadie Chen MD

## 2021-09-13 ENCOUNTER — PATIENT OUTREACH (OUTPATIENT)
Dept: CASE MANAGEMENT | Age: 26
End: 2021-09-13

## 2021-09-13 NOTE — PROGRESS NOTES
Patient contacted regarding COVID-19 suspected infection. Discussed COVID-19 related testing which was pending at this time. Test results were pending. Patient informed of results, if available? Win informed that she can access test results via [employer] when available. Ambulatory Care Manager contacted the patient by telephone to perform post discharge assessment. Call within 2 business days of discharge: Yes Verified name and  with patient as identifiers. Provided introduction to self, and explanation of the CTN/ACM role, and reason for call due to risk factors for infection and/or exposure to COVID-19. Symptoms reviewed with patient who verbalized the following symptoms: fatigue, cough, nausea and sore throat with ear pain      Due to no new or worsening symptoms encounter was not routed to provider for escalation. Discussed follow-up appointments. If no appointment was previously scheduled, appointment scheduling offered:  Yes. States she does not have PCP or insurance. Is agreeable to having a [facility] physician. ACM contacted [address] [place] [address] coordinator. [facility] follow up appointment(s): No future appointments. Non-[facility] follow up appointment(s): NA    Interventions to address risk factors: Obtained and reviewed discharge summary and/or continuity of care documents     Advance Care Planning:   Does patient have an Advance Directive: not on file. Educated patient about risk for severe COVID-19 due to risk factors according to CDC guidelines. ACM reviewed discharge instructions, medical action plan and red flag symptoms with the patient who verbalized understanding. Discussed COVID vaccination status: yes. Education provided on COVID-19 vaccination as appropriate. Discussed exposure protocols and quarantine with CDC Guidelines. Patient was given an opportunity to verbalize any questions and concerns and agrees to contact ACM or health care provider for questions related to their healthcare.     Reviewed and educated patient on any new and changed medications related to discharge diagnosis     Was patient discharged with a pulse oximeter? no Discussed and confirmed pulse oximeter discharge instructions and when to notify provider or seek emergency care. ACM provided contact information. Plan for follow-up call in 5-7 days based on severity of symptoms and risk factors. Patient has not been vaccinated and does not plan to receive vaccine. States she lives in home with 14 other people, no one else has symptoms and she is quarantined in her bedroom.  is not quarantined until test results are back. ACM explained that everyone needed to be quarantined until results are back.

## 2021-09-14 ENCOUNTER — TELEPHONE (OUTPATIENT)
Dept: FAMILY MEDICINE CLINIC | Facility: CLINIC | Age: 26
End: 2021-09-14

## 2021-09-14 LAB — SARS-COV-2, COV2NT: DETECTED

## 2021-09-14 NOTE — TELEPHONE ENCOUNTER
Spoke with patient and informed her on what services the [place] [address] offer. Patient expressed understanding. Patient scheduled for an telemedicine visit on 09/17/21 with Dr. Figueroa Daniel.

## 2021-09-16 PROBLEM — J96.01 ACUTE HYPOXEMIC RESPIRATORY FAILURE (HCC): Status: ACTIVE | Noted: 2021-09-16

## 2021-09-16 PROBLEM — J12.82 PNEUMONIA DUE TO COVID-19 VIRUS: Status: ACTIVE | Noted: 2021-09-16

## 2021-09-16 PROBLEM — Z34.90 PREGNANCY: Status: ACTIVE | Noted: 2021-09-16

## 2021-09-16 PROBLEM — J18.9 MULTIFOCAL PNEUMONIA: Status: ACTIVE | Noted: 2021-09-16

## 2021-09-16 PROBLEM — J96.91 RESPIRATORY FAILURE WITH HYPOXIA (HCC): Status: ACTIVE | Noted: 2021-09-16

## 2021-09-16 PROBLEM — U07.1 PNEUMONIA DUE TO COVID-19 VIRUS: Status: ACTIVE | Noted: 2021-09-16

## 2021-09-16 PROBLEM — R06.00 DYSPNEA: Status: ACTIVE | Noted: 2021-09-16

## 2021-09-16 PROBLEM — U07.1 COVID-19: Status: ACTIVE | Noted: 2021-09-16

## 2021-09-16 PROBLEM — Z34.90 EARLY STAGE OF PREGNANCY: Status: ACTIVE | Noted: 2021-09-16

## 2021-09-16 PROBLEM — U07.1 COVID-19 VIRUS INFECTION: Status: ACTIVE | Noted: 2021-09-16

## 2021-09-16 PROBLEM — R09.02 HYPOXIA: Status: ACTIVE | Noted: 2021-09-16

## 2021-09-24 PROBLEM — J96.01 ACUTE HYPOXEMIC RESPIRATORY FAILURE (HCC): Status: RESOLVED | Noted: 2021-09-16 | Resolved: 2021-09-24

## 2021-09-24 PROBLEM — E11.65 UNCONTROLLED TYPE 2 DIABETES MELLITUS WITH HYPERGLYCEMIA (HCC): Status: ACTIVE | Noted: 2021-09-24

## 2021-09-28 ENCOUNTER — PATIENT OUTREACH (OUTPATIENT)
Dept: FAMILY MEDICINE CLINIC | Facility: CLINIC | Age: 26
End: 2021-09-28

## 2021-09-28 NOTE — PROGRESS NOTES
Hospital Discharge Follow-Up      Date/Time:  9/28/2021 4:24 PM    Patient was admitted to [facility] on 9/16/21 and discharged on 9/24/21 for COVID-19 PNA, Acute hypoxic Resp failure, Asthma, Uncontrolled DM2 with hyperglycemia, Pregnancy, . The physician discharge summary was available at the time of outreach. Attempted to contact patient within 2 business days of discharge. Unable to reach patient. Letter mailed to patient. Top Challenges reviewed with the provider   Unable to reach patient for follow up. Method of communication with provider :staff message    Inpatient RRAT score: unknown  Was this a readmission? no     Current Outpatient Medications   Medication Sig    metFORMIN (GLUCOPHAGE) 500 mg tablet Take 500 mg by mouth two (2) times daily (with meals).  insulin glargine (Lantus Solostar U-100 Insulin) 100 unit/mL (3 mL) inpn 60 Units by SubCUTAneous route nightly for 30 days. Indications: type 2 diabetes mellitus    insulin lispro (HUMALOG) 100 unit/mL kwikpen 20 Units by SubCUTAneous route Before breakfast, lunch, and dinner for 30 days. Indications: type 2 diabetes mellitus    Blood-Glucose Meter monitoring kit For use to check blood glucose three times a day before meals and at night before evening dose of insulin.  glucose blood VI test strips (blood glucose test) strip For use with glucometer of choice    Lancing Device misc For use to check blood glucose three times and day before meals, and before evening dose of insulin  Indications: DM2    lancets misc For use with lancing device of choice.  Insulin Needles, Disposable, (Pen Needle) 30 gauge x 5/16\" ndle For use with insulin lantus  Indications: DM2    Insulin Needles, Disposable, (Pen Needle) 30 gauge x 5/16\" ndle For use with insulin lispro pen. Indications: DM2     No current facility-administered medications for this visit. There are no discontinued medications.     [facility] follow up appointment(s): No future appointments.

## 2021-09-28 NOTE — LETTER
9/30/2021 2:49 PM    Ms. Adina Sorto  [address]  [facility] [address] Rita Valdez am a Nurse Navigator working with the [address] [place] [address]. I have been unable to reach you by telephone. Part of my job is to follow up with patients who have been in the hospital to see how they are feeling, answer any questions they may have about their visit and also make sure they follow-up with their primary care doctor. I see you have not established care with us, as you were unable to make your appointment, due to being hospitalized. If you do not have insurance and still need a primary care doctor, I encourage you to give us a call to schedule an appointment. You may reach me directly at [phone] if you have any questions or would like to schedule an appointment.         Sincerely,      Cherie Vincent RN

## 2022-03-18 PROBLEM — J12.82 PNEUMONIA DUE TO COVID-19 VIRUS: Status: ACTIVE | Noted: 2021-09-16

## 2022-03-18 PROBLEM — U07.1 PNEUMONIA DUE TO COVID-19 VIRUS: Status: ACTIVE | Noted: 2021-09-16

## 2022-03-18 PROBLEM — E11.65 UNCONTROLLED TYPE 2 DIABETES MELLITUS WITH HYPERGLYCEMIA (HCC): Status: ACTIVE | Noted: 2021-09-24

## 2022-03-18 PROBLEM — E66.01 OBESITY, MORBID (HCC): Status: ACTIVE | Noted: 2017-12-04

## 2022-03-19 PROBLEM — K80.50 BILIARY COLIC: Status: ACTIVE | Noted: 2017-01-04

## 2022-03-19 PROBLEM — Z34.90 PREGNANCY: Status: ACTIVE | Noted: 2021-09-16

## 2022-03-19 PROBLEM — Z34.90 EARLY STAGE OF PREGNANCY: Status: ACTIVE | Noted: 2021-09-16

## 2022-03-23 ENCOUNTER — HOSPITAL ENCOUNTER (EMERGENCY)
Age: 27
Discharge: HOME | End: 2022-03-23
Attending: EMERGENCY MEDICINE
Payer: MEDICAID

## 2022-03-23 VITALS
HEART RATE: 94 BPM | TEMPERATURE: 97.7 F | RESPIRATION RATE: 18 BRPM | DIASTOLIC BLOOD PRESSURE: 92 MMHG | HEIGHT: 64 IN | WEIGHT: 280 LBS | OXYGEN SATURATION: 97 % | BODY MASS INDEX: 47.8 KG/M2 | SYSTOLIC BLOOD PRESSURE: 156 MMHG

## 2022-03-23 DIAGNOSIS — N39.0 URINARY TRACT INFECTION WITHOUT HEMATURIA, SITE UNSPECIFIED: Primary | ICD-10-CM

## 2022-03-23 LAB — HCG UR QL: NEGATIVE

## 2022-03-23 PROCEDURE — 99283 EMERGENCY DEPT VISIT LOW MDM: CPT

## 2022-03-23 PROCEDURE — 81025 URINE PREGNANCY TEST: CPT

## 2022-03-23 PROCEDURE — 81001 URINALYSIS AUTO W/SCOPE: CPT

## 2022-03-23 RX ORDER — NITROFURANTOIN 25; 75 MG/1; MG/1
100 CAPSULE ORAL 2 TIMES DAILY
Qty: 10 CAPSULE | Refills: 0 | Status: SHIPPED | OUTPATIENT
Start: 2022-03-23 | End: 2022-03-28

## 2022-03-23 RX ORDER — HUMAN INSULIN 100 [IU]/ML
18 INJECTION, SOLUTION SUBCUTANEOUS
COMMUNITY
Start: 2022-03-16

## 2022-03-23 NOTE — ED PROVIDER NOTES
EMERGENCY DEPARTMENT HISTORY AND PHYSICAL EXAM    4:10 AM  Date: 3/23/2022  Patient Name: Ryley Damon    History of Presenting Illness       History Provided By:     HPI: Ryley Damon is a 23 y.o. female with asthma, diabetes, recurrent UTIs presents with dysuria, urinary frequency. Dysuria is mild severity, constant, no associated symptoms or modifying factors. Urinary frequency started 2 days ago, dysuria started last night. Patient denies any nausea vomiting fever, back pain or chills  Patient states that she just started her period. PCP: None    Past History     Past Medical History:  Past Medical History:   Diagnosis Date    Asthma     Chest pain     Insulin dependent type 2 diabetes mellitus, uncontrolled ([facility])     Last HbA1c: 10.8% (on 9/24/2021)    Obesity, Class III, BMI 40-49.9 (morbid obesity) ([facility])     BMI 48.06 on 3/23/2022    PCOS (polycystic ovarian syndrome)     SARS-CoV-2 positive 09/16/2021    Covid-19+ w/ Pneumonia    UTI (urinary tract infection)        Past Surgical History:  Past Surgical History:   Procedure Laterality Date    HX CHOLECYSTECTOMY      HX ORTHOPAEDIC  7years old    186 stitches in arm (trauma), general anesthesia used    [place] REMOVAL GALLBLADDER N/A 12/22/2016    Dr. Fernanda Connelly       Family History:  Family History   Problem Relation Age of Onset    OSTEOARTHRITIS Mother         throughout her spine    Glaucoma Maternal Grandmother     COPD Maternal Grandmother     Kidney Disease Maternal Grandfather     Heart Disease Maternal Grandfather     Hypertension Paternal Grandmother     Hypertension Paternal Grandfather     Diabetes Paternal Grandfather     Cancer Maternal Aunt         thyroid    Other Maternal Aunt         \"tumors in uterus that weighed 50 lbs\"    Breast Cancer Maternal Aunt        Social History:  Social History     Tobacco Use    Smoking status: Never Smoker    Smokeless tobacco: Never Used   Substance Use Topics    Alcohol use:  Yes Alcohol/week: 1.0 standard drink     Types: 1 Glasses of wine per week     Comment: 5-6 times per year    Drug use: No       Allergies:  No Known Allergies    Review of Systems   Review of Systems   Constitutional: Negative for activity change, appetite change and chills. HENT: Negative for congestion, ear discharge, ear pain and sore throat. Eyes: Negative for photophobia and pain. Respiratory: Negative for cough and choking. Cardiovascular: Negative for palpitations and leg swelling. Gastrointestinal: Negative for anal bleeding, nausea, rectal pain and vomiting. Endocrine: Negative for polydipsia and polyuria. Genitourinary: Positive for dysuria. Negative for genital sores and urgency. Musculoskeletal: Negative for arthralgias and myalgias. Neurological: Negative for dizziness, seizures and speech difficulty. Psychiatric/Behavioral: Negative for hallucinations, self-injury and suicidal ideas. Physical Exam     Patient Vitals for the past 12 hrs:   Temp Pulse Resp BP SpO2   03/23/22 0410 97.7 °F (36.5 °C) 94 18 (!) 156/92 97 %       Physical Exam  Vitals and nursing note reviewed. Constitutional:       Appearance: She is well-developed. HENT:      Head: Normocephalic and atraumatic. Eyes:      General:         Right eye: No discharge. Left eye: No discharge. Cardiovascular:      Rate and Rhythm: Normal rate and regular rhythm. Heart sounds: Normal heart sounds. No murmur heard. Pulmonary:      Effort: Pulmonary effort is normal. No respiratory distress. Breath sounds: Normal breath sounds. No stridor. No wheezing or rales. Chest:      Chest wall: No tenderness. Abdominal:      General: Bowel sounds are normal. There is no distension. Palpations: Abdomen is soft. Tenderness: There is no abdominal tenderness. There is no guarding or rebound. Musculoskeletal:         General: Normal range of motion.       Cervical back: Normal range of motion and neck supple. Skin:     General: Skin is warm and dry. Neurological:      Mental Status: She is alert and oriented to person, place, and time. Diagnostic Study Results     Labs -  Recent Results (from the past 12 hour(s))   HCG URINE, QL    Collection Time: 03/23/22  4:20 AM   Result Value Ref Range    HCG urine, QL Negative NEG     URINE MICROSCOPIC ONLY    Collection Time: 03/23/22  4:20 AM   Result Value Ref Range    WBC TOO NUMEROUS TO COUNT 0 - 4 /hpf    RBC TOO NUMEROUS TO COUNT 0 - 5 /hpf    Epithelial cells FEW 0 - 5 /lpf    Bacteria Negative NEG /hpf       Radiologic Studies -   No results found. Medical Decision Making     ED Course: Progress Notes, Reevaluation, and Consults:    4:10 AM Initial assessment performed. The patients presenting problems have been discussed, and they/their family are in agreement with the care plan formulated and outlined with them. I have encouraged them to ask questions as they arise throughout their visit. Provider Notes (Medical Decision Making):   Patient presents with dysuria and urinary frequency. Patient is on first day of prescription. Vitals within normal limits apart from hypertension  Abdomen soft nontender, no CVA tenderness  Abdomen soft nontender  UA within leukocyte esterase, white cells  Given patient's symptoms patient will be treated for UTI  Return precautions given abdominal pain fever, back pain or any other concerns              Vital Signs-Reviewed the patient's vital signs. Reviewed pt's pulse ox reading. Records Reviewed: old medical records  -I am the first provider for this patient.  -I reviewed the vital signs, available nursing notes, past medical history, past surgical history, family history and social history. Current Outpatient Medications   Medication Sig Dispense Refill    NovoLIN R Regular U-100 Insuln 100 unit/mL injection 18 Units by SubCUTAneous route three (3) times daily (with meals).       nitrofurantoin, macrocrystal-monohydrate, (MACROBID) 100 mg capsule Take 1 Capsule by mouth two (2) times a day for 5 days. Indications: UTI 10 Capsule 0    Blood-Glucose Meter monitoring kit For use to check blood glucose three times a day before meals and at night before evening dose of insulin. 1 Kit 0    glucose blood VI test strips (blood glucose test) strip For use with glucometer of choice 100 Strip 1    Lancing Device misc For use to check blood glucose three times and day before meals, and before evening dose of insulin  Indications: DM2 1 Each 0    lancets misc For use with lancing device of choice. 100 Each 1    Insulin Needles, Disposable, (Pen Needle) 30 gauge x 5/16\" ndle For use with insulin lantus  Indications: DM2 100 Each 0    Insulin Needles, Disposable, (Pen Needle) 30 gauge x 5/16\" ndle For use with insulin lispro pen. Indications: DM2 100 Each 1        Clinical Impression     Clinical Impression:   1. Urinary tract infection without hematuria, site unspecified        Disposition: This note was dictated utilizing voice recognition software which may lead to typographical errors. I apologize in advance if the situation occurs. If questions arise please do not hesitate to contact me or call our department.     Daiana Cody MD  4:10 AM

## 2022-03-23 NOTE — Clinical Note
[address] [facility] EMERGENCY DEPT  [address] [address] [phone]    Work/School Note    Date: 3/23/2022    To Whom It May concern:      Olayinka Ramos was seen and treated today in the emergency room by the following provider(s):  Attending Provider: Bruce Pichardo MD.      Olayinka Ramos is excused from work/school on 03/23/22. She is clear to return to work/school on 03/24/22.         Sincerely,          Gus Dooley MD

## 2022-03-23 NOTE — ED TRIAGE NOTES
Alert and oriented female, ambulatory to triage with c/o urinary frequency x 2 days and painful urination that began tonight. Denies n/v/d, denies vaginal discharge/bleeding.

## 2022-03-24 LAB
APPEARANCE UR: ABNORMAL
BACTERIA URNS QL MICRO: NEGATIVE /HPF
BILIRUB UR QL: NEGATIVE
COLOR UR: ABNORMAL
EPITH CASTS URNS QL MICRO: NORMAL /LPF (ref 0–5)
GLUCOSE UR STRIP.AUTO-MCNC: NEGATIVE MG/DL
HGB UR QL STRIP: ABNORMAL
KETONES UR QL STRIP.AUTO: NEGATIVE MG/DL
LEUKOCYTE ESTERASE UR QL STRIP.AUTO: ABNORMAL
NITRITE UR QL STRIP.AUTO: NEGATIVE
PH UR STRIP: 7 [PH] (ref 5–8)
PROT UR STRIP-MCNC: 100 MG/DL
RBC #/AREA URNS HPF: NORMAL /HPF (ref 0–5)
SP GR UR REFRACTOMETRY: 1.02 (ref 1–1.03)
SP GR UR REFRACTOMETRY: 1.02 (ref 1–1.03)
UROBILINOGEN UR QL STRIP.AUTO: 0.2 EU/DL (ref 0.2–1)
WBC URNS QL MICRO: NORMAL /HPF (ref 0–4)

## 2022-07-17 ENCOUNTER — HOSPITAL ENCOUNTER (EMERGENCY)
Age: 27
Discharge: HOME | End: 2022-07-17
Attending: STUDENT IN AN ORGANIZED HEALTH CARE EDUCATION/TRAINING PROGRAM
Payer: MEDICAID

## 2022-07-17 ENCOUNTER — APPOINTMENT (OUTPATIENT)
Dept: GENERAL RADIOLOGY | Age: 27
End: 2022-07-17
Attending: STUDENT IN AN ORGANIZED HEALTH CARE EDUCATION/TRAINING PROGRAM
Payer: MEDICAID

## 2022-07-17 VITALS
SYSTOLIC BLOOD PRESSURE: 181 MMHG | TEMPERATURE: 97.7 F | OXYGEN SATURATION: 100 % | HEART RATE: 99 BPM | BODY MASS INDEX: 49.51 KG/M2 | DIASTOLIC BLOOD PRESSURE: 84 MMHG | RESPIRATION RATE: 20 BRPM | HEIGHT: 64 IN | WEIGHT: 290 LBS

## 2022-07-17 DIAGNOSIS — M53.3 COCCYGEAL PAIN, ACUTE: Primary | ICD-10-CM

## 2022-07-17 LAB — HCG UR QL: NEGATIVE

## 2022-07-17 PROCEDURE — 81025 URINE PREGNANCY TEST: CPT

## 2022-07-17 PROCEDURE — 99284 EMERGENCY DEPT VISIT MOD MDM: CPT

## 2022-07-17 PROCEDURE — 72220 X-RAY EXAM SACRUM TAILBONE: CPT

## 2022-07-17 NOTE — ED TRIAGE NOTES
Pt reports tailbone pain that began last week but started to feel better. Pain returned last night.  Pt denies any known injury/fall etc.

## 2022-07-17 NOTE — Clinical Note
Lesly Moreno was seen and treated in our emergency department on 7/17/2022. To whom it may concern:    Please give Ms. Forde a larger padded seat to sit in at work.       Kaylee Ramirez, DO

## 2022-07-17 NOTE — Clinical Note
Sanjay Hall was seen and treated in our emergency department on 7/17/2022. To whom it may concern:    Please give Ms. Forde a larger padded seat to sit in at work.       Auther Solders DO    Dax Suarez, DO

## 2022-07-17 NOTE — ED PROVIDER NOTES
EMERGENCY DEPARTMENT HISTORY AND PHYSICAL EXAM    I have evaluated the patient at 6:17 PM      Date: 7/17/2022  Patient Name: Zully Rebollar    History of Presenting Illness     Chief Complaint   Patient presents with    Tailbone Pain     denies any known injury         History Provided By: Patient  Location/Duration/Severity/Modifying factors   Patient is a 67-year-old female with history as below presenting to the emergency department for evaluation of sacrococcygeal pain. States has been ongoing for the past 2 days now. No injury or trauma that patient recalls. Patient works at a  and states that she sits on hard plastic chairs which she thinks are the cause of the pain and exacerbate the pain. She reports dull aching throughout her sacrococcygeal region. No radiation of the pain. No fevers or chills, saddle anesthesia, paresthesias, motor weakness lower extremities. No urinary or fecal incontinence. Denies IV drug use          PCP: None    Current Outpatient Medications   Medication Sig Dispense Refill    NovoLIN R Regular U-100 Insuln 100 unit/mL injection 18 Units by SubCUTAneous route three (3) times daily (with meals).  Blood-Glucose Meter monitoring kit For use to check blood glucose three times a day before meals and at night before evening dose of insulin. 1 Kit 0    glucose blood VI test strips (blood glucose test) strip For use with glucometer of choice 100 Strip 1    Lancing Device misc For use to check blood glucose three times and day before meals, and before evening dose of insulin  Indications: DM2 1 Each 0    lancets misc For use with lancing device of choice. 100 Each 1    Insulin Needles, Disposable, (Pen Needle) 30 gauge x 5/16\" ndle For use with insulin lantus  Indications: DM2 100 Each 0    Insulin Needles, Disposable, (Pen Needle) 30 gauge x 5/16\" ndle For use with insulin lispro pen.   Indications: DM2 100 Each 1       Past History     Past Medical History:  Past Medical History:   Diagnosis Date    Asthma     Chest pain     Insulin dependent type 2 diabetes mellitus, uncontrolled ([facility])     Last HbA1c: 10.8% (on 9/24/2021)    Obesity, Class III, BMI 40-49.9 (morbid obesity) ([facility])     BMI 48.06 on 3/23/2022    PCOS (polycystic ovarian syndrome)     SARS-CoV-2 positive 09/16/2021    Covid-19+ w/ Pneumonia    UTI (urinary tract infection)        Past Surgical History:  Past Surgical History:   Procedure Laterality Date    HX CHOLECYSTECTOMY      HX ORTHOPAEDIC  7years old    186 stitches in arm (trauma), general anesthesia used    SD REMOVAL GALLBLADDER N/A 12/22/2016    Dr. Alma Rodríguez       Family History:  Family History   Problem Relation Age of Onset    OSTEOARTHRITIS Mother         throughout her spine    Glaucoma Maternal Grandmother     COPD Maternal Grandmother     Kidney Disease Maternal Grandfather     Heart Disease Maternal Grandfather     Hypertension Paternal Grandmother     Hypertension Paternal Grandfather     Diabetes Paternal Grandfather     Cancer Maternal Aunt         thyroid    Other Maternal Aunt         \"tumors in uterus that weighed 50 lbs\"    Breast Cancer Maternal Aunt        Social History:  Social History     Tobacco Use    Smoking status: Never Smoker    Smokeless tobacco: Never Used   Substance Use Topics    Alcohol use: Yes     Alcohol/week: 1.0 standard drink     Types: 1 Glasses of wine per week     Comment: 5-6 times per year    Drug use: No       Allergies:  No Known Allergies      Review of Systems       Review of Systems   Constitutional: Negative for activity change, chills, diaphoresis, fatigue and fever. Eyes: Negative for photophobia, pain and visual disturbance. Respiratory: Negative for cough, chest tightness, shortness of breath, wheezing and stridor. Cardiovascular: Negative for chest pain and palpitations.    Gastrointestinal: Negative for abdominal distention, abdominal pain, constipation, diarrhea, nausea and vomiting. Genitourinary: Negative for difficulty urinating, dysuria, hematuria, pelvic pain, vaginal bleeding and vaginal discharge. Musculoskeletal: Positive for back pain (Sacral/coccygeal pain). Negative for joint swelling and myalgias. Skin: Negative for rash and wound. Neurological: Negative for dizziness, weakness, numbness and headaches. Psychiatric/Behavioral: Negative for agitation. The patient is not nervous/anxious. Physical Exam     Visit Vitals  BP (!) 181/84 (BP 1 Location: Left lower arm, BP Patient Position: Sitting)   Pulse 99   Temp 97.7 °F (36.5 °C)   Resp 20   Ht 5' 4\" (1.626 m)   Wt 131.5 kg (290 lb)   LMP 06/26/2022   SpO2 100%   BMI 49.78 kg/m²         Physical Exam  Constitutional:       General: She is not in acute distress. Appearance: She is not toxic-appearing. HENT:      Head: Normocephalic and atraumatic. Eyes:      Pupils: Pupils are equal, round, and reactive to light. Cardiovascular:      Rate and Rhythm: Normal rate and regular rhythm. Heart sounds: Normal heart sounds. No murmur heard. No friction rub. No gallop. Pulmonary:      Effort: Pulmonary effort is normal.      Breath sounds: Normal breath sounds. Abdominal:      General: There is no distension. Palpations: Abdomen is soft. There is no mass. Tenderness: There is no abdominal tenderness. There is no guarding. Hernia: No hernia is present. Musculoskeletal:         General: Tenderness present. No swelling or deformity. Normal range of motion. Cervical back: Normal range of motion and neck supple. Comments: Pain over patient's sacral region to palpation. Skin:     General: Skin is warm and dry. Findings: No rash. Neurological:      General: No focal deficit present. Mental Status: She is alert and oriented to person, place, and time. Cranial Nerves: No cranial nerve deficit. Sensory: No sensory deficit. Motor: No weakness. Coordination: Coordination normal.   Psychiatric:         Mood and Affect: Mood normal.           Diagnostic Study Results     Labs -  Recent Results (from the past 12 hour(s))   HCG URINE, QL    Collection Time: 07/17/22  6:20 PM   Result Value Ref Range    HCG urine, QL Negative NEG         Radiologic Studies -   XR SACRUM AND COCCYX    (Results Pending)         Medical Decision Making   I am the first provider for this patient. I reviewed the vital signs, available nursing notes, past medical history, past surgical history, family history and social history. Vital Signs-Reviewed the patient's vital signs. EKG:     Records Reviewed: Nursing Notes (Time of Review: 6:17 PM)    ED Course: Progress Notes, Reevaluation, and Consults:         Provider Notes (Medical Decision Making):   MDM  Number of Diagnoses or Management Options  Coccygeal pain, acute  Diagnosis management comments: Patient is well-appearing without any red flag symptoms concerning for cauda equina syndrome. There is some tenderness palpation to the sacral/coccygeal region but with full range of motion at the hip. X-ray obtained showing no evidence of any fracture or dislocation. Patient has been reassured and instructed on care at home. Instructed to sit on cushion seats has been given a work note. Return precautions advised. Patient verbalizes understanding and agreement with plan. Procedures    Critical Care Time:       Diagnosis     Clinical Impression:   1.  Coccygeal pain, acute        Disposition: home    Follow-up Information     Follow up With Specialties Details [facility] EMERGENCY DEPT Emergency Medicine  As needed, If symptoms worsen [address] [facility]  [phone]           Patient's Medications   Start Taking    No medications on file   Continue Taking    BLOOD-GLUCOSE METER MONITORING KIT    For use to check blood glucose three times a day before meals and at night before evening dose of insulin. GLUCOSE BLOOD VI TEST STRIPS (BLOOD GLUCOSE TEST) STRIP    For use with glucometer of choice    INSULIN NEEDLES, DISPOSABLE, (PEN NEEDLE) 30 GAUGE X 5/16\" NDLE    For use with insulin lantus  Indications: DM2    INSULIN NEEDLES, DISPOSABLE, (PEN NEEDLE) 30 GAUGE X 5/16\" NDLE    For use with insulin lispro pen. Indications: DM2    LANCETS MISC    For use with lancing device of choice. LANCING DEVICE MISC    For use to check blood glucose three times and day before meals, and before evening dose of insulin  Indications: DM2    NOVOLIN R REGULAR U-100 INSULN 100 UNIT/ML INJECTION    18 Units by SubCUTAneous route three (3) times daily (with meals). These Medications have changed    No medications on file   Stop Taking    No medications on file     Disclaimer: Sections of this note are dictated using utilizing voice recognition software. Minor typographical errors may be present. If questions arise, please do not hesitate to contact me or call our department.

## 2022-07-17 NOTE — ED NOTES
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 \"

## 2022-07-17 NOTE — ED NOTES
Bedside shift change report given to [facility] RN by Delta Eugene. Report included the following information SBAR, ED Summary and MAR.

## 2023-04-03 ENCOUNTER — HOSPITAL ENCOUNTER (EMERGENCY)
Facility: HOSPITAL | Age: 28
Discharge: HOME | End: 2023-04-03
Attending: EMERGENCY MEDICINE
Payer: MEDICAID

## 2023-04-03 VITALS
WEIGHT: 292 LBS | BODY MASS INDEX: 49.85 KG/M2 | TEMPERATURE: 98.3 F | HEIGHT: 64 IN | DIASTOLIC BLOOD PRESSURE: 66 MMHG | SYSTOLIC BLOOD PRESSURE: 135 MMHG | RESPIRATION RATE: 16 BRPM | HEART RATE: 85 BPM | OXYGEN SATURATION: 95 %

## 2023-04-03 DIAGNOSIS — R73.9 HYPERGLYCEMIA: ICD-10-CM

## 2023-04-03 DIAGNOSIS — I10 ESSENTIAL HYPERTENSION: Primary | ICD-10-CM

## 2023-04-03 DIAGNOSIS — R79.89 ELEVATED LFTS: ICD-10-CM

## 2023-04-03 LAB
ALBUMIN SERPL-MCNC: 3.8 G/DL (ref 3.4–5)
ALBUMIN/GLOB SERPL: 1 (ref 0.8–1.7)
ALP SERPL-CCNC: 106 U/L (ref 45–117)
ALT SERPL-CCNC: 84 U/L (ref 13–56)
ANION GAP SERPL CALC-SCNC: 6 MMOL/L (ref 3–18)
APPEARANCE UR: CLEAR
AST SERPL-CCNC: 55 U/L (ref 10–38)
BASOPHILS # BLD: 0 K/UL (ref 0–0.1)
BASOPHILS NFR BLD: 1 % (ref 0–2)
BILIRUB SERPL-MCNC: 0.3 MG/DL (ref 0.2–1)
BILIRUB UR QL: NEGATIVE
BUN SERPL-MCNC: 11 MG/DL (ref 7–18)
BUN/CREAT SERPL: 12 (ref 12–20)
CALCIUM SERPL-MCNC: 8.7 MG/DL (ref 8.5–10.1)
CHLORIDE SERPL-SCNC: 100 MMOL/L (ref 100–111)
CO2 SERPL-SCNC: 28 MMOL/L (ref 21–32)
COLOR UR: YELLOW
CREAT SERPL-MCNC: 0.95 MG/DL (ref 0.6–1.3)
DIFFERENTIAL METHOD BLD: ABNORMAL
EOSINOPHIL # BLD: 0.1 K/UL (ref 0–0.4)
EOSINOPHIL NFR BLD: 1 % (ref 0–5)
EPITH CASTS URNS QL MICRO: NORMAL /LPF (ref 0–5)
ERYTHROCYTE [DISTWIDTH] IN BLOOD BY AUTOMATED COUNT: 12.4 % (ref 11.6–14.5)
GLOBULIN SER CALC-MCNC: 4 G/DL (ref 2–4)
GLUCOSE BLD STRIP.AUTO-MCNC: 382 MG/DL (ref 70–110)
GLUCOSE BLD STRIP.AUTO-MCNC: 487 MG/DL (ref 70–110)
GLUCOSE BLD STRIP.AUTO-MCNC: 501 MG/DL (ref 70–110)
GLUCOSE SERPL-MCNC: 515 MG/DL (ref 74–99)
GLUCOSE UR STRIP.AUTO-MCNC: >1000 MG/DL
HCG UR QL: NEGATIVE
HCT VFR BLD AUTO: 46.2 % (ref 35–45)
HGB BLD-MCNC: 15.2 G/DL (ref 12–16)
HGB UR QL STRIP: ABNORMAL
IMM GRANULOCYTES # BLD AUTO: 0.1 K/UL (ref 0–0.04)
IMM GRANULOCYTES NFR BLD AUTO: 1 % (ref 0–0.5)
KETONES UR QL STRIP.AUTO: NEGATIVE MG/DL
LEUKOCYTE ESTERASE UR QL STRIP.AUTO: NEGATIVE
LYMPHOCYTES # BLD: 2 K/UL (ref 0.9–3.6)
LYMPHOCYTES NFR BLD: 32 % (ref 21–52)
MCH RBC QN AUTO: 28.2 PG (ref 24–34)
MCHC RBC AUTO-ENTMCNC: 32.9 G/DL (ref 31–37)
MCV RBC AUTO: 85.7 FL (ref 78–100)
MONOCYTES # BLD: 0.4 K/UL (ref 0.05–1.2)
MONOCYTES NFR BLD: 6 % (ref 3–10)
NEUTS SEG # BLD: 3.6 K/UL (ref 1.8–8)
NEUTS SEG NFR BLD: 59 % (ref 40–73)
NITRITE UR QL STRIP.AUTO: NEGATIVE
NRBC # BLD: 0 K/UL (ref 0–0.01)
NRBC BLD-RTO: 0 PER 100 WBC
PH UR STRIP: 6.5 (ref 5–8)
PLATELET # BLD AUTO: 194 K/UL (ref 135–420)
PMV BLD AUTO: 12.9 FL (ref 9.2–11.8)
POTASSIUM SERPL-SCNC: 3.8 MMOL/L (ref 3.5–5.5)
PROT SERPL-MCNC: 7.8 G/DL (ref 6.4–8.2)
PROT UR STRIP-MCNC: NEGATIVE MG/DL
RBC # BLD AUTO: 5.39 M/UL (ref 4.2–5.3)
RBC #/AREA URNS HPF: NORMAL /HPF (ref 0–5)
SODIUM SERPL-SCNC: 134 MMOL/L (ref 136–145)
SP GR UR REFRACTOMETRY: 1.02 (ref 1–1.03)
UROBILINOGEN UR QL STRIP.AUTO: 0.2 EU/DL (ref 0.2–1)
WBC # BLD AUTO: 6 K/UL (ref 4.6–13.2)
WBC URNS QL MICRO: NORMAL /HPF (ref 0–4)

## 2023-04-03 PROCEDURE — 85025 COMPLETE CBC W/AUTO DIFF WBC: CPT

## 2023-04-03 PROCEDURE — 82962 GLUCOSE BLOOD TEST: CPT

## 2023-04-03 PROCEDURE — 6360000002 HC RX W HCPCS: Performed by: EMERGENCY MEDICINE

## 2023-04-03 PROCEDURE — 81001 URINALYSIS AUTO W/SCOPE: CPT

## 2023-04-03 PROCEDURE — 96374 THER/PROPH/DIAG INJ IV PUSH: CPT | Performed by: EMERGENCY MEDICINE

## 2023-04-03 PROCEDURE — 2580000003 HC RX 258: Performed by: EMERGENCY MEDICINE

## 2023-04-03 PROCEDURE — 96361 HYDRATE IV INFUSION ADD-ON: CPT | Performed by: EMERGENCY MEDICINE

## 2023-04-03 PROCEDURE — 99284 EMERGENCY DEPT VISIT MOD MDM: CPT | Performed by: EMERGENCY MEDICINE

## 2023-04-03 PROCEDURE — 80053 COMPREHEN METABOLIC PANEL: CPT

## 2023-04-03 PROCEDURE — 81025 URINE PREGNANCY TEST: CPT

## 2023-04-03 RX ORDER — 0.9 % SODIUM CHLORIDE 0.9 %
1000 INTRAVENOUS SOLUTION INTRAVENOUS ONCE
Status: COMPLETED | OUTPATIENT
Start: 2023-04-03 | End: 2023-04-03

## 2023-04-03 RX ORDER — ONDANSETRON 2 MG/ML
4 INJECTION INTRAMUSCULAR; INTRAVENOUS ONCE
Status: COMPLETED | OUTPATIENT
Start: 2023-04-03 | End: 2023-04-03

## 2023-04-03 RX ADMIN — ONDANSETRON 4 MG: 2 INJECTION INTRAMUSCULAR; INTRAVENOUS at 16:09

## 2023-04-03 RX ADMIN — SODIUM CHLORIDE 1000 ML: 900 INJECTION, SOLUTION INTRAVENOUS at 16:54

## 2023-04-03 RX ADMIN — SODIUM CHLORIDE 1000 ML: 9 INJECTION, SOLUTION INTRAVENOUS at 16:01

## 2023-04-03 ASSESSMENT — PAIN DESCRIPTION - ORIENTATION: ORIENTATION: LOWER

## 2023-04-03 ASSESSMENT — PAIN - FUNCTIONAL ASSESSMENT: PAIN_FUNCTIONAL_ASSESSMENT: 0-10

## 2023-04-03 ASSESSMENT — PAIN DESCRIPTION - LOCATION: LOCATION: ABDOMEN

## 2023-04-03 ASSESSMENT — LIFESTYLE VARIABLES: HOW OFTEN DO YOU HAVE A DRINK CONTAINING ALCOHOL: NEVER

## 2023-04-03 NOTE — ED TRIAGE NOTES
Pt to ED for eval of abdominal pain and nausea x 1 week. Pt also reports she did not take her insulin x 2 days. Pt did take it just PTA when she saw her glucose was 500. Pt also reports her period is late. 25 Rafy Zapien 3/1.

## 2023-04-03 NOTE — ED PROVIDER NOTES
[facility] EMERGENCY DEPT  EMERGENCY DEPARTMENT ENCOUNTER    Patient Name: Kevan Burrell  MRN: [id number]  YOB: 1995  Provider: Beata Monzon MD  PCP: No primary care provider on file. Time/Date of evaluation: 3:45 PM EDT on 4/3/23    History of Presenting Illness     History Provided by: Patient  History is limited by: Nothing     HISTORY:   Kevan Burrell is a 33 y.o. female past medical history of insulin-dependent diabetes as well as asthma. She presents with a 2-day history of nausea without vomiting. She is still been able to eat and drink without any difficulties. She has had some loose stools but this is on change for her. She denies any chest pain shortness of breath or cough. Some very mild abdominal \"discomfort\" but she denies any abdominal pain. She denies any urinary symptoms. She normally takes 18 units of short acting insulin with each meal however admits that she has not taken this for the last 2 days and was eating foods high in sugar. She did take 1 dose of this just prior to arrival.    Nursing Notes were all reviewed and agreed with or any disagreements were addressed in the HPI.     Past History     PAST MEDICAL HISTORY:  Past Medical History:   Diagnosis Date    Asthma     Chest pain     Insulin dependent type 2 diabetes mellitus, uncontrolled ([facility])     Last HbA1c: 10.8% (on 9/24/2021)    Obesity, Class III, BMI 40-49.9 (morbid obesity) ([facility])     BMI 48.06 on 3/23/2022    PCOS (polycystic ovarian syndrome)     SARS-CoV-2 positive 09/16/2021    Covid-19+ w/ Pneumonia    UTI (urinary tract infection)        PAST SURGICAL HISTORY:  Past Surgical History:   Procedure Laterality Date    CHOLECYSTECTOMY      ORTHOPEDIC SURGERY  7years old    186 stitches in arm (trauma), general anesthesia used    REMOVAL GALLBLADDER N/A 12/22/2016    Dr. Yoselin Doyle:  Family History   Problem Relation Age of Onset    Hypertension Paternal Grandfather     Diabetes

## 2023-04-03 NOTE — ED NOTES
Discharge instructions reviewed with patient. Patient verbalized understanding. Patient advised to follow up as directed on discharge instructions. Patient denies questions, needs or concerns at this time. Patient verbalized understanding. No s/sx of distress noted.         Francisco Garcia RN  04/03/23 [id number]

## 2023-06-10 ENCOUNTER — APPOINTMENT (OUTPATIENT)
Facility: HOSPITAL | Age: 28
End: 2023-06-10
Payer: COMMERCIAL

## 2023-06-10 ENCOUNTER — HOSPITAL ENCOUNTER (EMERGENCY)
Facility: HOSPITAL | Age: 28
Discharge: HOME | End: 2023-06-10
Attending: EMERGENCY MEDICINE
Payer: COMMERCIAL

## 2023-06-10 VITALS
WEIGHT: 292 LBS | DIASTOLIC BLOOD PRESSURE: 74 MMHG | RESPIRATION RATE: 16 BRPM | HEART RATE: 99 BPM | TEMPERATURE: 99.1 F | HEIGHT: 64 IN | SYSTOLIC BLOOD PRESSURE: 130 MMHG | BODY MASS INDEX: 49.85 KG/M2

## 2023-06-10 DIAGNOSIS — M25.561 ACUTE PAIN OF RIGHT KNEE: Primary | ICD-10-CM

## 2023-06-10 PROCEDURE — 73560 X-RAY EXAM OF KNEE 1 OR 2: CPT

## 2023-06-10 ASSESSMENT — PAIN DESCRIPTION - ONSET: ONSET: ON-GOING

## 2023-06-10 ASSESSMENT — PAIN DESCRIPTION - LOCATION: LOCATION: KNEE

## 2023-06-10 ASSESSMENT — PAIN DESCRIPTION - DESCRIPTORS: DESCRIPTORS: ACHING;TINGLING

## 2023-06-10 ASSESSMENT — PAIN DESCRIPTION - FREQUENCY: FREQUENCY: CONTINUOUS

## 2023-06-10 ASSESSMENT — PAIN DESCRIPTION - ORIENTATION: ORIENTATION: RIGHT

## 2023-06-10 ASSESSMENT — PAIN SCALES - GENERAL: PAINLEVEL_OUTOF10: 6

## 2023-06-10 ASSESSMENT — PAIN - FUNCTIONAL ASSESSMENT
PAIN_FUNCTIONAL_ASSESSMENT: 0-10
PAIN_FUNCTIONAL_ASSESSMENT: ACTIVITIES ARE NOT PREVENTED

## 2023-06-10 ASSESSMENT — PAIN DESCRIPTION - PAIN TYPE: TYPE: ACUTE PAIN

## 2023-06-10 NOTE — ED NOTES
Discharge instructions reviewed with patient. Patient verbalized understanding. Patient advised to follow up as directed on discharge instructions. Patient denies questions, needs or concerns at this time. Patient verbalized understanding. No s/sx of distress noted.        Dami Pisano RN  06/10/23 1951

## 2023-06-10 NOTE — ED PROVIDER NOTES
absence of a cardiologist.        RADIOLOGY:   Non-plain film images such as CT, Ultrasound and MRI are read by the radiologist. Plain radiographic images are visualized and preliminarily interpreted by the emergency physician with the below findings:        Interpretation per the Radiologist below, if available at the time of this note:    XR KNEE RIGHT (1-2 VIEWS)    (Results Pending)         ED BEDSIDE ULTRASOUND:   Performed by ED Physician - none    LABS:  Labs Reviewed - No data to display    All other labs were within normal range or not returned as of this dictation. EMERGENCY DEPARTMENT COURSE and DIFFERENTIAL DIAGNOSIS/MDM:   Vitals:    Vitals:    06/10/23 1845   BP: 130/74   Pulse: 99   Resp: 16   Temp: 99.1 °F (37.3 °C)   TempSrc: Oral   Weight: 292 lb (132.5 kg)   Height: 5' 4\" (1.626 m)           Medical Decision Making  32-year-old female presents with right knee pain. X-rays unremarkable physical exam is normal.  Will discharge patient home. Patient had a Ace wrap placed around knee before discharge. Differential diagnosis patella fracture dislocation musculoskeletal injury malingering    Amount and/or Complexity of Data Reviewed  Radiology: ordered. REASSESSMENT          CRITICAL CARE TIME   Total Critical Care time was  minutes, excluding separately reportable procedures. There was a high probability of clinically significant/life threatening deterioration in the patient's condition which required my urgent intervention. CONSULTS:  None    PROCEDURES:  Unless otherwise noted below, none     Procedures        FINAL IMPRESSION      1.  Acute pain of right knee          DISPOSITION/PLAN   DISPOSITION Decision To Discharge 06/10/2023 07:43:12 PM      PATIENT REFERRED TO:  Todd Amor, [address]  [address] [place] [address]  [phone]    Call in 2 days        DISCHARGE MEDICATIONS:  Discharge Medication List as of 6/10/2023  7:48 PM        Controlled

## 2023-06-10 NOTE — ED TRIAGE NOTES
C/o right knee pain from recent slip and fall on wet door mat. Reports new onset tingling around knee cap. Took no home treatment. Was able to fully weight bear and ambulate to triage area.

## 2023-06-11 ASSESSMENT — ENCOUNTER SYMPTOMS: RESPIRATORY NEGATIVE: 1

## 2023-06-19 ENCOUNTER — HOSPITAL ENCOUNTER (EMERGENCY)
Facility: HOSPITAL | Age: 28
Discharge: HOME | End: 2023-06-19
Attending: EMERGENCY MEDICINE
Payer: COMMERCIAL

## 2023-06-19 VITALS
RESPIRATION RATE: 16 BRPM | DIASTOLIC BLOOD PRESSURE: 85 MMHG | OXYGEN SATURATION: 100 % | HEART RATE: 91 BPM | TEMPERATURE: 98.5 F | HEIGHT: 64 IN | BODY MASS INDEX: 50.02 KG/M2 | WEIGHT: 293 LBS | SYSTOLIC BLOOD PRESSURE: 130 MMHG

## 2023-06-19 DIAGNOSIS — H66.002 ACUTE SUPPURATIVE OTITIS MEDIA OF LEFT EAR WITHOUT SPONTANEOUS RUPTURE OF TYMPANIC MEMBRANE, RECURRENCE NOT SPECIFIED: Primary | ICD-10-CM

## 2023-06-19 DIAGNOSIS — J02.9 ACUTE PHARYNGITIS, UNSPECIFIED ETIOLOGY: ICD-10-CM

## 2023-06-19 LAB — DEPRECATED S PYO AG THROAT QL EIA: NEGATIVE

## 2023-06-19 PROCEDURE — 99283 EMERGENCY DEPT VISIT LOW MDM: CPT | Performed by: EMERGENCY MEDICINE

## 2023-06-19 PROCEDURE — 87070 CULTURE OTHR SPECIMN AEROBIC: CPT

## 2023-06-19 PROCEDURE — 87880 STREP A ASSAY W/OPTIC: CPT

## 2023-06-19 PROCEDURE — 87147 CULTURE TYPE IMMUNOLOGIC: CPT

## 2023-06-19 RX ORDER — AMOXICILLIN 500 MG/1
500 CAPSULE ORAL 3 TIMES DAILY
Qty: 30 CAPSULE | Refills: 0 | Status: SHIPPED | OUTPATIENT
Start: 2023-06-19 | End: 2023-06-29

## 2023-06-19 ASSESSMENT — PAIN - FUNCTIONAL ASSESSMENT: PAIN_FUNCTIONAL_ASSESSMENT: NONE - DENIES PAIN

## 2023-06-19 ASSESSMENT — LIFESTYLE VARIABLES: HOW OFTEN DO YOU HAVE A DRINK CONTAINING ALCOHOL: NEVER

## 2023-06-20 NOTE — ED PROVIDER NOTES
(electronically signed)        Nelly Ruby     Please note that this dictation was completed with [employer], the computer voice recognition software. Quite often unanticipated grammatical, syntax, homophones, and other interpretive errors are inadvertently transcribed by the computer software. Please disregard these errors. Please excuse any errors that have escaped final proofreading.        Radha Morrow MD  06/20/23 [id number]

## 2023-06-22 LAB
BACTERIA SPEC CULT: ABNORMAL
BACTERIA SPEC CULT: ABNORMAL
SERVICE CMNT-IMP: ABNORMAL

## 2023-06-28 ENCOUNTER — OFFICE VISIT (OUTPATIENT)
Age: 28
End: 2023-06-28

## 2023-06-28 VITALS — TEMPERATURE: 98.2 F | HEIGHT: 64 IN | WEIGHT: 290 LBS | BODY MASS INDEX: 49.51 KG/M2

## 2023-06-28 DIAGNOSIS — M25.561 ACUTE PAIN OF RIGHT KNEE: Primary | ICD-10-CM

## 2023-06-28 DIAGNOSIS — S80.01XD CONTUSION OF RIGHT KNEE, SUBSEQUENT ENCOUNTER: ICD-10-CM

## 2023-06-28 DIAGNOSIS — M22.41 CHONDROMALACIA, PATELLA, RIGHT: ICD-10-CM

## 2023-07-10 ENCOUNTER — HOSPITAL ENCOUNTER (OUTPATIENT)
Facility: HOSPITAL | Age: 28
Setting detail: SPECIMEN
Discharge: HOME | End: 2023-07-13
Payer: COMMERCIAL

## 2023-07-10 ENCOUNTER — OFFICE VISIT (OUTPATIENT)
Age: 28
End: 2023-07-10
Payer: COMMERCIAL

## 2023-07-10 VITALS
DIASTOLIC BLOOD PRESSURE: 72 MMHG | HEIGHT: 64 IN | BODY MASS INDEX: 50.02 KG/M2 | SYSTOLIC BLOOD PRESSURE: 130 MMHG | OXYGEN SATURATION: 96 % | TEMPERATURE: 97.5 F | HEART RATE: 106 BPM | WEIGHT: 293 LBS | RESPIRATION RATE: 18 BRPM

## 2023-07-10 DIAGNOSIS — Z71.89 ENCOUNTER FOR PRE-BARIATRIC SURGERY COUNSELING AND EDUCATION: ICD-10-CM

## 2023-07-10 DIAGNOSIS — E66.01 OBESITY, MORBID (HCC): Primary | ICD-10-CM

## 2023-07-10 DIAGNOSIS — E28.2 PCOS (POLYCYSTIC OVARIAN SYNDROME): ICD-10-CM

## 2023-07-10 DIAGNOSIS — G47.30 SLEEP APNEA, UNSPECIFIED TYPE: ICD-10-CM

## 2023-07-10 DIAGNOSIS — K21.9 GASTROESOPHAGEAL REFLUX DISEASE, UNSPECIFIED WHETHER ESOPHAGITIS PRESENT: ICD-10-CM

## 2023-07-10 DIAGNOSIS — E11.65 UNCONTROLLED TYPE 2 DIABETES MELLITUS WITH HYPERGLYCEMIA (HCC): ICD-10-CM

## 2023-07-10 PROBLEM — J45.909 ASTHMA: Status: ACTIVE | Noted: 2023-07-10

## 2023-07-10 PROCEDURE — 83013 H PYLORI (C-13) BREATH: CPT

## 2023-07-10 PROCEDURE — 99204 OFFICE O/P NEW MOD 45 MIN: CPT | Performed by: SURGERY

## 2023-07-11 ENCOUNTER — HOSPITAL ENCOUNTER (OUTPATIENT)
Facility: HOSPITAL | Age: 28
Discharge: HOME | End: 2023-07-14
Attending: SPECIALIST
Payer: COMMERCIAL

## 2023-07-11 DIAGNOSIS — M22.41 CHONDROMALACIA, PATELLA, RIGHT: ICD-10-CM

## 2023-07-11 DIAGNOSIS — M25.561 ACUTE PAIN OF RIGHT KNEE: ICD-10-CM

## 2023-07-11 PROCEDURE — 73721 MRI JNT OF LWR EXTRE W/O DYE: CPT

## 2023-07-12 LAB — UREA BREATH TEST QL: NEGATIVE

## 2023-07-19 ENCOUNTER — HOSPITAL ENCOUNTER (OUTPATIENT)
Facility: HOSPITAL | Age: 28
Setting detail: RECURRING SERIES
Discharge: HOME | End: 2023-07-22
Payer: COMMERCIAL

## 2023-07-19 PROCEDURE — 97535 SELF CARE MNGMENT TRAINING: CPT

## 2023-07-19 PROCEDURE — 97110 THERAPEUTIC EXERCISES: CPT

## 2023-07-19 PROCEDURE — 97162 PT EVAL MOD COMPLEX 30 MIN: CPT

## 2023-07-19 NOTE — PROGRESS NOTES
[address] [facility] [address] [facility] XA:[phone] Fx: [phone]    PLAN OF CARE/ Statement of Necessity for Physical Therapy Services           Patient name: Renetta Salmeron Start of Care: 2023   Referral source: Jaxson Jameson MD : 1995    Medical Diagnosis: Pain in right knee [M25.561]       Onset Date:2023    Treatment Diagnosis: M25.561  RIGHT KNEE PAIN                                      Prior Hospitalization: see medical history Provider#: [id number]   Medications: Verified on Patient Summary List     Comorbidities: BMI > 50, Diabetes  Prior Level of Function: The patient reports having no pain walking or performing chores prior to injury. The Plan of Care and following information is based on the information from the initial evaluation. Assessment / key information:  The patient is a 33year old female with a chief complaint of right knee pain that began when she slipped on a mat and fell on her right knee. She has had radiographs and a recent MRI, though it has not been released. She reports pain around her knee cap and this pain limits her ease of walking and chore production. Upon examination, she presents with point tenderness around her right patella with notable lateral positioning. She has signs and symptoms consistent with mal-tracking of her patella with impairments consisting of pain, decreased ROM, decreased flexibility, decreased strength, limited ADL ease, and decreased quality of life. The patient will benefit from skilled PT in order to address the aforementioned impairments.     Evaluation Complexity:  History:  MEDIUM  Complexity : 1-2 comorbidities / personal factors will impact the outcome/ POC ; Examination:  MEDIUM Complexity : 3 Standardized tests and measures addressin body structure, function, activity limitation and / or participation in recreation  ;Presentation:  MEDIUM

## 2023-07-19 NOTE — PROGRESS NOTES
PHYSICAL / OCCUPATIONAL THERAPY - DAILY TREATMENT NOTE (updated )    Patient Name: Carlos Dorsey    Date: 2023    : 1995  Insurance: Payor: Florentino Naik / Plan: Florentino Naik [place] LOCAL / Product Type: *No Product type* /      Patient  verified Yes     Visit #   Current / Total 1 10   Time   In / Out 8:08 8:40   Pain   In / Out 6/10 6/10   Subjective Functional Status/Changes: See IE   Changes to: Allergies, Med Hx, Sx Hx?   no       TREATMENT AREA =  Pain in right knee [M25.561]    OBJECTIVE  Therapeutic Procedures: Tx Min Billable or 1:1 Min (if diff from Tx Min) Procedure, Rationale, Specifics   15 15 [id number] Therapeutic Exercise (timed):  increase ROM, strength, coordination, balance, and proprioception to improve patient's ability to progress to PLOF and address remaining functional goals. (see flow sheet as applicable)     Details if applicable:       8 8 [id number] Self Care/Home Management (timed):  improve patient knowledge and understanding of pain reducing techniques, activity modification, diagnosis/prognosis, and physical therapy expectations, procedures and progression  to improve patient's ability to progress to PLOF and address remaining functional goals.   (see flow sheet as applicable)     Details if applicable:     23 21 [facility] Totals Reminder: bill using total billable min of TIMED therapeutic procedures (example: do not include dry needle or estim unattended, both untimed codes, in totals to left)  8-22 min = 1 unit; 23-37 min = 2 units; 38-52 min = 3 units; 53-67 min = 4 units; 68-82 min = 5 units   Total Total     TOTAL TREATMENT TIME:        32     [x]  Patient Education billed concurrently with other procedures   [x] Review HEP    [] Progressed/Changed HEP, detail:    [] Other detail:       Objective Information/Functional Measures/Assessment    See POC    Patient will continue to benefit from skilled PT / OT services to modify and progress therapeutic interventions,

## 2023-07-20 ENCOUNTER — CLINICAL DOCUMENTATION (OUTPATIENT)
Facility: HOSPITAL | Age: 28
End: 2023-07-20

## 2023-07-20 ENCOUNTER — HOSPITAL ENCOUNTER (OUTPATIENT)
Facility: HOSPITAL | Age: 28
Discharge: HOME | End: 2023-07-23

## 2023-07-20 NOTE — PROGRESS NOTES
Patient was also educated on carbohydrates. Patient was instructed to start cutting out bread, rice, and pasta from the diet and start focusing more on meat and vegetables. I then gave a power point, which focused on Label Reading. In class, I gave patients a labels and we worked through a series of questions to help patients have a better understanding of label reading. Patient was instructed to review the serving size. Patient was encouraged to focus on protein and carbohydrates. We also did a few label reading activities to help the patient become more familiar with label reading. We also talked about healthier snack options in class that focus on protein and vegetables. Patient's current diet habits include: Patient's current diet habits include: Patient is eating 3 meals a day. We talked in class about the importance of eating 3 meals a day and eating within 1 hour of waking up. Breakfast ideas were addressed with the patient. Patient states their portion sizes are small plate and cut back on portions. I have encouraged patient to use a smaller plate and fill up on water before meals to help cut down on portions. Patient is eating fast food: 1-2 time a week. Carry out intake is: 1-2 times a week. Sit down restaurant intake is: 1 times a week. Patient's is eating bread, rice, pasta, cereal, and other carbohydrates cut back since starting this process, but states it was previously on a regular basis. Patient is snacking on carrots and cucumbers. This is being done between meals a few times a week. Patient's sweet intake is cut this out. times a week. I have talked about the importance of getting into the habit now of cutting the sweets out. Fluid intake:  64 ounces of water. Gave up soda and sweet tea. I have talked to patient about the importance of getting into a routine of sipping fluid throughout the day. Cutting out all liquid calories.   Aim for 64 ounces of fluid per

## 2023-07-26 ENCOUNTER — HOSPITAL ENCOUNTER (OUTPATIENT)
Facility: HOSPITAL | Age: 28
Setting detail: RECURRING SERIES
Discharge: HOME | End: 2023-07-29
Payer: COMMERCIAL

## 2023-07-26 PROCEDURE — 97110 THERAPEUTIC EXERCISES: CPT

## 2023-07-26 PROCEDURE — 97112 NEUROMUSCULAR REEDUCATION: CPT

## 2023-07-26 NOTE — PROGRESS NOTES
AM Sumi Connor, PT [facility]   8/28/2023 11:00 AM Sangita Galeas, APRN - NP [facility]

## 2023-07-28 ENCOUNTER — HOSPITAL ENCOUNTER (OUTPATIENT)
Facility: HOSPITAL | Age: 28
Setting detail: RECURRING SERIES
Discharge: HOME | End: 2023-07-31
Payer: COMMERCIAL

## 2023-07-28 PROCEDURE — 97530 THERAPEUTIC ACTIVITIES: CPT

## 2023-07-28 PROCEDURE — 97110 THERAPEUTIC EXERCISES: CPT

## 2023-07-28 PROCEDURE — 97112 NEUROMUSCULAR REEDUCATION: CPT

## 2023-07-28 NOTE — PROGRESS NOTES
analyze and modify for postural abnormalities, and analyze and address imbalance/dizziness to address functional deficits and attain remaining goals. Progress toward goals / Updated goals:  []  See Progress Note/Recertification    Short Term Goals: To be accomplished in 2 weeks  The patient will demonstrate independence and compliance of HEP to maximize therapeutic benefit. IE: issued HEP              Current: reports compliance 7/26/23  The patient will improve right knee flexion without pain at end range to improve ease of bed mobility. IE: pain of patella with knee flexion  Long Term Goals: To be accomplished in 5 weeks  The patient will improve FOTO score to 63 to improve quality of life. IE: 39  The patient will demonstrate negative hamstring and gastroc flexibility testing to reduce stress of anterior knee during ambulation to allow for improved ease of ambulation. IE: positive gastroc/hamstring flexibility  The patient will improve right hip ABD/EXT strength to 5/5 MMT to maximize stability in stance. IE: 4/5 MMT EXT, 4+/5 MMT ABD  The patient will report no greater than 2/10 pain in order to improve ease of ambulation efficiency.               IE: 6/10    PLAN  Yes  Continue plan of care  [x]  Upgrade activities as tolerated  []  Discharge due to :  []  Other:    Enmanuel Ramírez PTA    7/28/2023    7:33 AM    Future Appointments   Date Time Provider [address]   7/28/2023  8:30 AM SKIP [place]   8/1/2023 11:10 AM [facility] [place]   8/2/2023  2:45 PM DO DREW Butt BS AMB   8/9/2023 11:00 AM Sandra Trinidad MD VS BS AMB   8/11/2023 10:20 AM Edwige Guerrero APRN - NP [facility] BS AMB   8/14/2023  1:10 PM Ade Pleitez, PT [facility] [place]   8/15/2023  9:30 AM HBV FLUORO RM 1 [facility] [place]   8/16/2023  9:15 AM HBV BARIATRIC DIETITIAN [facility] [place]   8/16/2023 11:10

## 2023-08-01 ENCOUNTER — HOSPITAL ENCOUNTER (OUTPATIENT)
Facility: HOSPITAL | Age: 28
Setting detail: RECURRING SERIES
Discharge: HOME | End: 2023-08-04
Payer: COMMERCIAL

## 2023-08-01 PROCEDURE — 97110 THERAPEUTIC EXERCISES: CPT

## 2023-08-01 PROCEDURE — 97530 THERAPEUTIC ACTIVITIES: CPT

## 2023-08-01 PROCEDURE — 97112 NEUROMUSCULAR REEDUCATION: CPT

## 2023-08-01 NOTE — PROGRESS NOTES
PHYSICAL / OCCUPATIONAL THERAPY - DAILY TREATMENT NOTE (updated )    Patient Name: Sreekanth Waddell    Date: 2023    : 1995  Insurance: Payor: Annia Rios / Plan: Annia Rios [place]PO [place] LOCAL / Product Type: *No Product type* /      Patient  verified Yes     Visit #   Current / Total 4 10   Time   In / Out 11:00 12:00   Pain   In / Out 6 0   Subjective Functional Status/Changes: Pt states her knee hurts today, she woke up with the pain. Changes to: Allergies, Med Hx, Sx Hx?   no       TREATMENT AREA =  Pain in right knee [M25.561]    OBJECTIVE    Modalities Rationale:     decrease pain to improve patient's ability to progress to PLOF and address remaining functional goals. min [] Estim Unattended, type/location:                                      []  w/ice    []  w/heat    min [] Estim Attended, type/location:                                     []  w/US     []  w/ice    []  w/heat    []  TENS insruct      min []  Mechanical Traction: type/lbs                   []  pro   []  sup   []  int   []  cont    []  before manual    []  after manual    min []  Ultrasound, settings/location:      min []  Iontophoresis w/ dexamethasone, location:                                               []  take home patch       []  in clinic     10   min  unbilled [x]  Ice     []  Heat    location/position: Right knee/ long sit    min []  Paraffin,  details:     min []  Vasopneumatic Device, press/temp:     min []  Janki Antoniot / Radha Serge: If using vaso (only need to measure limb vaso being performed on)      pre-treatment girth :       post-treatment girth :       measured at (landmark location) :      min []  Other:    Skin assessment post-treatment (if applicable):    []  intact    []  redness- no adverse reaction                 []redness - adverse reaction:         Therapeutic Procedures:   Tx Min Billable or 1:1 Min (if diff from Tx Min) Procedure, Rationale, Specifics   [id number] Therapeutic Exercise

## 2023-08-02 ENCOUNTER — OFFICE VISIT (OUTPATIENT)
Age: 28
End: 2023-08-02
Payer: COMMERCIAL

## 2023-08-02 VITALS
RESPIRATION RATE: 18 BRPM | TEMPERATURE: 97.9 F | SYSTOLIC BLOOD PRESSURE: 129 MMHG | BODY MASS INDEX: 48.83 KG/M2 | OXYGEN SATURATION: 95 % | HEIGHT: 64 IN | DIASTOLIC BLOOD PRESSURE: 78 MMHG | WEIGHT: 286 LBS | HEART RATE: 82 BPM

## 2023-08-02 DIAGNOSIS — E28.2 PCOS (POLYCYSTIC OVARIAN SYNDROME): ICD-10-CM

## 2023-08-02 DIAGNOSIS — E11.65 UNCONTROLLED TYPE 2 DIABETES MELLITUS WITH HYPERGLYCEMIA (HCC): ICD-10-CM

## 2023-08-02 DIAGNOSIS — R29.818 SUSPECTED SLEEP APNEA: Primary | ICD-10-CM

## 2023-08-02 DIAGNOSIS — R06.83 SNORING: ICD-10-CM

## 2023-08-02 DIAGNOSIS — G47.26 SHIFT WORK SLEEP DISORDER: ICD-10-CM

## 2023-08-02 DIAGNOSIS — E66.01 OBESITY, MORBID (HCC): ICD-10-CM

## 2023-08-02 PROCEDURE — 99203 OFFICE O/P NEW LOW 30 MIN: CPT | Performed by: OTOLARYNGOLOGY

## 2023-08-02 ASSESSMENT — SLEEP AND FATIGUE QUESTIONNAIRES
HOW LIKELY ARE YOU TO NOD OFF OR FALL ASLEEP WHILE SITTING INACTIVE IN A PUBLIC PLACE: 0
NECK CIRCUMFERENCE (INCHES): 17
ESS TOTAL SCORE: 5
HOW LIKELY ARE YOU TO NOD OFF OR FALL ASLEEP WHILE WATCHING TV: 1
HOW LIKELY ARE YOU TO NOD OFF OR FALL ASLEEP IN A CAR, WHILE STOPPED FOR A FEW MINUTES IN TRAFFIC: 0
HOW LIKELY ARE YOU TO NOD OFF OR FALL ASLEEP WHILE SITTING QUIETLY AFTER LUNCH WITHOUT ALCOHOL: 0
HOW LIKELY ARE YOU TO NOD OFF OR FALL ASLEEP WHILE SITTING AND READING: 0
HOW LIKELY ARE YOU TO NOD OFF OR FALL ASLEEP WHEN YOU ARE A PASSENGER IN A CAR FOR AN HOUR WITHOUT A BREAK: 2
HOW LIKELY ARE YOU TO NOD OFF OR FALL ASLEEP WHILE SITTING AND TALKING TO SOMEONE: 1
HOW LIKELY ARE YOU TO NOD OFF OR FALL ASLEEP WHILE LYING DOWN TO REST IN THE AFTERNOON WHEN CIRCUMSTANCES PERMIT: 1

## 2023-08-02 ASSESSMENT — ENCOUNTER SYMPTOMS
COUGH: 0
CHOKING: 0
SHORTNESS OF BREATH: 0
STRIDOR: 0
CHEST TIGHTNESS: 0

## 2023-08-02 ASSESSMENT — PATIENT HEALTH QUESTIONNAIRE - PHQ9
SUM OF ALL RESPONSES TO PHQ QUESTIONS 1-9: 0
SUM OF ALL RESPONSES TO PHQ QUESTIONS 1-9: 0
2. FEELING DOWN, DEPRESSED OR HOPELESS: 0
SUM OF ALL RESPONSES TO PHQ QUESTIONS 1-9: 0
SUM OF ALL RESPONSES TO PHQ9 QUESTIONS 1 & 2: 0
1. LITTLE INTEREST OR PLEASURE IN DOING THINGS: 0
SUM OF ALL RESPONSES TO PHQ QUESTIONS 1-9: 0

## 2023-08-02 NOTE — PROGRESS NOTES
[facility]  Pulmonary, Critical Care, and Sleep Medicine    Office Progress Note - Initial Evaluation      Primary Care Physician: None None     Reason for Visit: Evaluation for obstructive sleep apnea/sleep disordered breathing    Assessment:  1. Suspected sleep apnea  2. Snoring  3. Shift work sleep disorder -historically I think this was an issue with the patient is \"on leave\" from her night shift job due to knee pain. She is sleeping on a more \" normal\" schedule but is a self-described night owl. 4. Obesity, morbid ([address])   5. Uncontrolled type 2 diabetes mellitus with hyperglycemia (HCC) - HgbA1C was 10.8 on 9/24/21. Needs to establish care with a PCM as indicated in Dr. Ambrocio Kovacs note, does use insulin  6. PCOS (polycystic ovarian syndrome)       Arsh Knapp is a 56-year-old female with a history of snoring, type 1 diabetes, morbid obesity who is a candidate for bariatric surgery. Her STOP-BANG score is positive for 3 out of 8 elements indicating a at least a mild risk of obstructive sleep apnea. We did discuss the definition and nature of obstructive sleep apnea and how it is important to diagnose and treat this as it relates to other medical disorders. Additionally, we would like to investigate this prior to her upcoming surgery this fall. I did go over the kinds of sleep apnea testing available and we both agree that a home sleep apnea test is reasonable and adequate to evaluate for obstructive sleep apnea in the setting. We also did briefly discuss CPAP today. I will order home sleep apnea test and have her follow-up in the office for a discussion about the results and treatment options for presumed obstructive sleep apnea. Patient is happy with this plan and wants to move forward with testing. Potential consequences of untreated sleep apnea, and/or excessive daytime sleepiness were discussed with the patient. Educational materials provided.   Treatment
no    Medication list has been updated according to patient.

## 2023-08-02 NOTE — PATIENT INSTRUCTIONS
Please make a follow up appointment to discuss the results of your sleep study. If this is impossible for some reason, please send me a \"My Chart\" message so that I may get back with you in a timely manner. The [facility] is located in the [address] [facility], adjacent to [facility]. The lab is on the second floor. The direct number to call for sleep study related questions is: [phone]. Please call our clinic back at [phone] or send a message on [place] if you have any questions or concerns or if you are experiencing any of the following: You have not received a follow up appointment within 30 days prior the recommended follow up time. If you are not tolerating treatment plan and/or not able to obtain equipment or prescribed medication(s). if you are experiencing any difficulties with the [facility] [employer]  (DME) Company you may be using or is assigned to you. Two weeks have passed and you have not received an appointment for a scheduled procedure. Two weeks have passed since you underwent a test and/or procedure and you have not received your results. If you are using a CPAP/BIPAP, or Home Ventilator Device- Please note the following. Currently, many DMEs are experiencing supply chain difficulties and orders for equipment may be back logged several weeks. Your  Durable Medical Equipment (DME ) company is supposed to provide you with replacement filters, tubing and masks. You can either call your DME when you need new supplies or you can arrange for an automatic shipment schedule. Your need to be seen by our office at lat minimum of every 12 months in order to renew the prescription for these supplies. Please make note of who your DME company is and their phone number. Please make sure that you clean your mask and hosing on a regular basis.   Your DME can provide you with additional information regarding proper care and cleaning of your

## 2023-08-09 ENCOUNTER — OFFICE VISIT (OUTPATIENT)
Age: 28
End: 2023-08-09
Payer: COMMERCIAL

## 2023-08-09 VITALS — BODY MASS INDEX: 49 KG/M2 | TEMPERATURE: 97.7 F | WEIGHT: 287 LBS | HEIGHT: 64 IN

## 2023-08-09 DIAGNOSIS — M94.261 CHONDROMALACIA, RIGHT KNEE: ICD-10-CM

## 2023-08-09 DIAGNOSIS — M25.561 ACUTE PAIN OF RIGHT KNEE: Primary | ICD-10-CM

## 2023-08-09 DIAGNOSIS — E66.01 MORBID OBESITY (HCC): ICD-10-CM

## 2023-08-09 DIAGNOSIS — S80.01XD CONTUSION OF RIGHT KNEE, SUBSEQUENT ENCOUNTER: ICD-10-CM

## 2023-08-09 PROCEDURE — 99213 OFFICE O/P EST LOW 20 MIN: CPT | Performed by: SPECIALIST

## 2023-08-09 SDOH — HEALTH STABILITY: PHYSICAL HEALTH: ON AVERAGE, HOW MANY MINUTES DO YOU ENGAGE IN EXERCISE AT THIS LEVEL?: 10 MIN

## 2023-08-09 SDOH — HEALTH STABILITY: PHYSICAL HEALTH: ON AVERAGE, HOW MANY DAYS PER WEEK DO YOU ENGAGE IN MODERATE TO STRENUOUS EXERCISE (LIKE A BRISK WALK)?: 1 DAY

## 2023-08-10 ENCOUNTER — APPOINTMENT (OUTPATIENT)
Facility: HOSPITAL | Age: 28
End: 2023-08-10
Payer: COMMERCIAL

## 2023-08-11 ENCOUNTER — OFFICE VISIT (OUTPATIENT)
Age: 28
End: 2023-08-11
Payer: COMMERCIAL

## 2023-08-11 VITALS
SYSTOLIC BLOOD PRESSURE: 138 MMHG | WEIGHT: 282.2 LBS | HEART RATE: 93 BPM | RESPIRATION RATE: 20 BRPM | OXYGEN SATURATION: 99 % | BODY MASS INDEX: 48.18 KG/M2 | HEIGHT: 64 IN | TEMPERATURE: 98.2 F | DIASTOLIC BLOOD PRESSURE: 75 MMHG

## 2023-08-11 DIAGNOSIS — J45.20 MILD INTERMITTENT ASTHMA WITHOUT COMPLICATION: ICD-10-CM

## 2023-08-11 DIAGNOSIS — E11.65 UNCONTROLLED TYPE 2 DIABETES MELLITUS WITH HYPERGLYCEMIA (HCC): ICD-10-CM

## 2023-08-11 DIAGNOSIS — Z00.00 ANNUAL PHYSICAL EXAM: ICD-10-CM

## 2023-08-11 DIAGNOSIS — R09.89 WEAK PULSE: ICD-10-CM

## 2023-08-11 DIAGNOSIS — Z11.59 ENCOUNTER FOR HEPATITIS C SCREENING TEST FOR LOW RISK PATIENT: ICD-10-CM

## 2023-08-11 DIAGNOSIS — Z23 IMMUNIZATION DUE: ICD-10-CM

## 2023-08-11 DIAGNOSIS — E28.2 PCOS (POLYCYSTIC OVARIAN SYNDROME): ICD-10-CM

## 2023-08-11 DIAGNOSIS — Z23 NEED FOR PNEUMOCOCCAL VACCINATION: Primary | ICD-10-CM

## 2023-08-11 PROCEDURE — 90677 PCV20 VACCINE IM: CPT | Performed by: NURSE PRACTITIONER

## 2023-08-11 PROCEDURE — 90471 IMMUNIZATION ADMIN: CPT | Performed by: NURSE PRACTITIONER

## 2023-08-11 PROCEDURE — 3046F HEMOGLOBIN A1C LEVEL >9.0%: CPT | Performed by: NURSE PRACTITIONER

## 2023-08-11 PROCEDURE — 99204 OFFICE O/P NEW MOD 45 MIN: CPT | Performed by: NURSE PRACTITIONER

## 2023-08-11 SDOH — ECONOMIC STABILITY: INCOME INSECURITY: HOW HARD IS IT FOR YOU TO PAY FOR THE VERY BASICS LIKE FOOD, HOUSING, MEDICAL CARE, AND HEATING?: SOMEWHAT HARD

## 2023-08-11 SDOH — ECONOMIC STABILITY: FOOD INSECURITY: WITHIN THE PAST 12 MONTHS, THE FOOD YOU BOUGHT JUST DIDN'T LAST AND YOU DIDN'T HAVE MONEY TO GET MORE.: NEVER TRUE

## 2023-08-11 SDOH — ECONOMIC STABILITY: FOOD INSECURITY: WITHIN THE PAST 12 MONTHS, YOU WORRIED THAT YOUR FOOD WOULD RUN OUT BEFORE YOU GOT MONEY TO BUY MORE.: NEVER TRUE

## 2023-08-11 SDOH — ECONOMIC STABILITY: HOUSING INSECURITY
IN THE LAST 12 MONTHS, WAS THERE A TIME WHEN YOU DID NOT HAVE A STEADY PLACE TO SLEEP OR SLEPT IN A SHELTER (INCLUDING NOW)?: NO

## 2023-08-11 ASSESSMENT — ENCOUNTER SYMPTOMS
NAUSEA: 0
ABDOMINAL PAIN: 0
SHORTNESS OF BREATH: 0
CHEST TIGHTNESS: 0
VOMITING: 0
COUGH: 0

## 2023-08-11 ASSESSMENT — PATIENT HEALTH QUESTIONNAIRE - PHQ9
SUM OF ALL RESPONSES TO PHQ QUESTIONS 1-9: 0
1. LITTLE INTEREST OR PLEASURE IN DOING THINGS: 0
2. FEELING DOWN, DEPRESSED OR HOPELESS: 0
SUM OF ALL RESPONSES TO PHQ9 QUESTIONS 1 & 2: 0

## 2023-08-11 NOTE — PROGRESS NOTES
Ashley Allen (:  1995) is a 33 y.o. female, here for evaluation of the following medical concerns:  Chief Complaint   Patient presents with    Diabetes         ASSESSMENT/PLAN:  1. Need for pneumococcal vaccination    - Pneumococcal, PCV20, PREVNAR 21, (age 25 yrs+), IM, PF    2. Weak pulse    - Vascular ankle brachial index (CLARISSA); Future    3. Uncontrolled type 2 diabetes mellitus with hyperglycemia (HCC)    - Vascular ankle brachial index (CLARISSA); Future  - CBC with Auto Differential; Future  - Comprehensive Metabolic Panel; Future  - Hemoglobin A1C; Future  - Lipid Panel; Future  - TSH; Future  - Urinalysis with Microscopic; Future  - Microalbumin / Creatinine Urine Ratio; Future    4. PCOS (polycystic ovarian syndrome)      5. Mild intermittent asthma without complication      6. Encounter for hepatitis C screening test for low risk patient    - Hepatitis C Antibody; Future      No follow-ups on file. Diabetes  Pertinent negatives for hypoglycemia include no dizziness or headaches. Associated symptoms include polydipsia and polyuria. Pertinent negatives for diabetes include no chest pain, no fatigue and no weakness. She is new to the clinic for type 2 diabetes taking humulin R 3 times a day with meals. History of type 2 dm, pcos, obesity. SUBJECTIVE:  33 y.o. female for follow up of diabetes. Diabetic Review of Systems - medication compliance: compliant all of the time, diabetic diet compliance: unsure, home glucose monitoring: is performed regularly, last eye exam approximately recently has new glasses ago. Other symptoms and concerns: polyuria, polydipsia. Postprandial 280s>240s    Needs dental visit. Weak pulse on left foot, nails need trimmed. She reports taking novolin R 18 units with three meals, taking since , last workup for DM was .      Current Outpatient Medications   Medication Sig Dispense Refill    insulin NPH (HUMULIN N;NOVOLIN N) 100 UNIT/ML injection pen

## 2023-08-11 NOTE — PROGRESS NOTES
1. \"Have you been to the ER, urgent care clinic since your last visit? Hospitalized since your last visit? \" Yes When: 7/24 [facility]: [facility] Reason for visit: swelling    2. \"Have you seen or consulted any other health care providers outside of the [address] [place] [address] since your last visit? \" Yes obgyn Dr Ta Mcclain [facility]       3. For patients aged 43-73: Has the patient had a colonoscopy / FIT/ Cologuard? NA - based on age      If the patient is female:    4. For patients aged 43-66: Has the patient had a mammogram within the past 2 years? NA - based on age or sex      11. For patients aged 21-65: Has the patient had a pap smear?  Yes - no Care Gap present- 2021

## 2023-08-12 ENCOUNTER — HOSPITAL ENCOUNTER (OUTPATIENT)
Facility: HOSPITAL | Age: 28
Discharge: HOME | End: 2023-08-15
Payer: COMMERCIAL

## 2023-08-12 LAB
25(OH)D3 SERPL-MCNC: 15.1 NG/ML (ref 30–100)
ALBUMIN SERPL-MCNC: 3.9 G/DL (ref 3.4–5)
ALBUMIN SERPL-MCNC: 4 G/DL (ref 3.4–5)
ALBUMIN/GLOB SERPL: 1.1 (ref 0.8–1.7)
ALBUMIN/GLOB SERPL: 1.2 (ref 0.8–1.7)
ALP SERPL-CCNC: 76 U/L (ref 45–117)
ALP SERPL-CCNC: 78 U/L (ref 45–117)
ALT SERPL-CCNC: 57 U/L (ref 13–56)
ALT SERPL-CCNC: 58 U/L (ref 13–56)
ANION GAP SERPL CALC-SCNC: 6 MMOL/L (ref 3–18)
ANION GAP SERPL CALC-SCNC: 8 MMOL/L (ref 3–18)
AST SERPL-CCNC: 27 U/L (ref 10–38)
AST SERPL-CCNC: 29 U/L (ref 10–38)
BASOPHILS # BLD: 0 K/UL (ref 0–0.1)
BASOPHILS # BLD: 0.1 K/UL (ref 0–0.1)
BASOPHILS NFR BLD: 0 % (ref 0–2)
BASOPHILS NFR BLD: 1 % (ref 0–2)
BILIRUB SERPL-MCNC: 0.6 MG/DL (ref 0.2–1)
BILIRUB SERPL-MCNC: 0.6 MG/DL (ref 0.2–1)
BUN SERPL-MCNC: 15 MG/DL (ref 7–18)
BUN SERPL-MCNC: 16 MG/DL (ref 7–18)
BUN/CREAT SERPL: 21 (ref 12–20)
BUN/CREAT SERPL: 24 (ref 12–20)
CALCIUM SERPL-MCNC: 9.1 MG/DL (ref 8.5–10.1)
CALCIUM SERPL-MCNC: 9.2 MG/DL (ref 8.5–10.1)
CHLORIDE SERPL-SCNC: 103 MMOL/L (ref 100–111)
CHLORIDE SERPL-SCNC: 105 MMOL/L (ref 100–111)
CHOLEST SERPL-MCNC: 242 MG/DL
CO2 SERPL-SCNC: 27 MMOL/L (ref 21–32)
CO2 SERPL-SCNC: 27 MMOL/L (ref 21–32)
CREAT SERPL-MCNC: 0.67 MG/DL (ref 0.6–1.3)
CREAT SERPL-MCNC: 0.71 MG/DL (ref 0.6–1.3)
DIFFERENTIAL METHOD BLD: ABNORMAL
DIFFERENTIAL METHOD BLD: ABNORMAL
EKG ATRIAL RATE: 79 BPM
EKG DIAGNOSIS: NORMAL
EKG P AXIS: 32 DEGREES
EKG P-R INTERVAL: 124 MS
EKG Q-T INTERVAL: 368 MS
EKG QRS DURATION: 84 MS
EKG QTC CALCULATION (BAZETT): 421 MS
EKG R AXIS: 38 DEGREES
EKG T AXIS: 35 DEGREES
EKG VENTRICULAR RATE: 79 BPM
EOSINOPHIL # BLD: 0.1 K/UL (ref 0–0.4)
EOSINOPHIL # BLD: 0.1 K/UL (ref 0–0.4)
EOSINOPHIL NFR BLD: 1 % (ref 0–5)
EOSINOPHIL NFR BLD: 1 % (ref 0–5)
ERYTHROCYTE [DISTWIDTH] IN BLOOD BY AUTOMATED COUNT: 12.8 % (ref 11.6–14.5)
ERYTHROCYTE [DISTWIDTH] IN BLOOD BY AUTOMATED COUNT: 13 % (ref 11.6–14.5)
EST. AVERAGE GLUCOSE BLD GHB EST-MCNC: 252 MG/DL
EST. AVERAGE GLUCOSE BLD GHB EST-MCNC: 255 MG/DL
FERRITIN SERPL-MCNC: 132 NG/ML (ref 8–388)
FOLATE SERPL-MCNC: 14.9 NG/ML (ref 3.1–17.5)
GLOBULIN SER CALC-MCNC: 3.4 G/DL (ref 2–4)
GLOBULIN SER CALC-MCNC: 3.6 G/DL (ref 2–4)
GLUCOSE SERPL-MCNC: 221 MG/DL (ref 74–99)
GLUCOSE SERPL-MCNC: 223 MG/DL (ref 74–99)
HBA1C MFR BLD HPLC: 10.5 %
HBA1C MFR BLD: 10.4 % (ref 4.2–5.6)
HBA1C MFR BLD: 10.5 % (ref 4.2–5.6)
HCT VFR BLD AUTO: 46.9 % (ref 35–45)
HCT VFR BLD AUTO: 48.2 % (ref 35–45)
HDLC SERPL-MCNC: 36 MG/DL (ref 40–60)
HDLC SERPL: 6.7 (ref 0–5)
HGB BLD-MCNC: 15.2 G/DL (ref 12–16)
HGB BLD-MCNC: 15.3 G/DL (ref 12–16)
IMM GRANULOCYTES # BLD AUTO: 0 K/UL (ref 0–0.04)
IMM GRANULOCYTES # BLD AUTO: 0 K/UL (ref 0–0.04)
IMM GRANULOCYTES NFR BLD AUTO: 0 % (ref 0–0.5)
IMM GRANULOCYTES NFR BLD AUTO: 0 % (ref 0–0.5)
IRON SATN MFR SERPL: 20 % (ref 20–50)
IRON SERPL-MCNC: 80 UG/DL (ref 50–175)
LDLC SERPL CALC-MCNC: 161.4 MG/DL (ref 0–100)
LIPID PANEL: ABNORMAL
LYMPHOCYTES # BLD: 1.8 K/UL (ref 0.9–3.6)
LYMPHOCYTES # BLD: 1.8 K/UL (ref 0.9–3.6)
LYMPHOCYTES NFR BLD: 26 % (ref 21–52)
LYMPHOCYTES NFR BLD: 26 % (ref 21–52)
MCH RBC QN AUTO: 28.6 PG (ref 24–34)
MCH RBC QN AUTO: 29.1 PG (ref 24–34)
MCHC RBC AUTO-ENTMCNC: 31.7 G/DL (ref 31–37)
MCHC RBC AUTO-ENTMCNC: 32.4 G/DL (ref 31–37)
MCV RBC AUTO: 89.8 FL (ref 78–100)
MCV RBC AUTO: 90.1 FL (ref 78–100)
MONOCYTES # BLD: 0.4 K/UL (ref 0.05–1.2)
MONOCYTES # BLD: 0.4 K/UL (ref 0.05–1.2)
MONOCYTES NFR BLD: 6 % (ref 3–10)
MONOCYTES NFR BLD: 6 % (ref 3–10)
NEUTS SEG # BLD: 4.6 K/UL (ref 1.8–8)
NEUTS SEG # BLD: 4.6 K/UL (ref 1.8–8)
NEUTS SEG NFR BLD: 66 % (ref 40–73)
NEUTS SEG NFR BLD: 67 % (ref 40–73)
NRBC # BLD: 0 K/UL (ref 0–0.01)
NRBC # BLD: 0 K/UL (ref 0–0.01)
NRBC BLD-RTO: 0 PER 100 WBC
NRBC BLD-RTO: 0 PER 100 WBC
PLATELET # BLD AUTO: 192 K/UL (ref 135–420)
PLATELET # BLD AUTO: 196 K/UL (ref 135–420)
PMV BLD AUTO: 13 FL (ref 9.2–11.8)
PMV BLD AUTO: 13.3 FL (ref 9.2–11.8)
POTASSIUM SERPL-SCNC: 3.9 MMOL/L (ref 3.5–5.5)
POTASSIUM SERPL-SCNC: 3.9 MMOL/L (ref 3.5–5.5)
PROT SERPL-MCNC: 7.4 G/DL (ref 6.4–8.2)
PROT SERPL-MCNC: 7.5 G/DL (ref 6.4–8.2)
RBC # BLD AUTO: 5.22 M/UL (ref 4.2–5.3)
RBC # BLD AUTO: 5.35 M/UL (ref 4.2–5.3)
SODIUM SERPL-SCNC: 138 MMOL/L (ref 136–145)
SODIUM SERPL-SCNC: 138 MMOL/L (ref 136–145)
TIBC SERPL-MCNC: 398 UG/DL (ref 250–450)
TRIGL SERPL-MCNC: 223 MG/DL
TSH SERPL DL<=0.05 MIU/L-ACNC: 1.55 UIU/ML (ref 0.36–3.74)
TSH SERPL DL<=0.05 MIU/L-ACNC: 1.67 UIU/ML (ref 0.36–3.74)
VIT B12 SERPL-MCNC: 391 PG/ML (ref 211–911)
VLDLC SERPL CALC-MCNC: 44.6 MG/DL
WBC # BLD AUTO: 6.9 K/UL (ref 4.6–13.2)
WBC # BLD AUTO: 7 K/UL (ref 4.6–13.2)

## 2023-08-12 PROCEDURE — 82746 ASSAY OF FOLIC ACID SERUM: CPT

## 2023-08-12 PROCEDURE — 84443 ASSAY THYROID STIM HORMONE: CPT

## 2023-08-12 PROCEDURE — 83550 IRON BINDING TEST: CPT

## 2023-08-12 PROCEDURE — 83036 HEMOGLOBIN GLYCOSYLATED A1C: CPT

## 2023-08-12 PROCEDURE — 84425 ASSAY OF VITAMIN B-1: CPT

## 2023-08-12 PROCEDURE — 82607 VITAMIN B-12: CPT

## 2023-08-12 PROCEDURE — 80061 LIPID PANEL: CPT

## 2023-08-12 PROCEDURE — 80053 COMPREHEN METABOLIC PANEL: CPT

## 2023-08-12 PROCEDURE — 86803 HEPATITIS C AB TEST: CPT

## 2023-08-12 PROCEDURE — 82306 VITAMIN D 25 HYDROXY: CPT

## 2023-08-12 PROCEDURE — 93010 ELECTROCARDIOGRAM REPORT: CPT | Performed by: INTERNAL MEDICINE

## 2023-08-12 PROCEDURE — 82728 ASSAY OF FERRITIN: CPT

## 2023-08-12 PROCEDURE — 93005 ELECTROCARDIOGRAM TRACING: CPT | Performed by: SURGERY

## 2023-08-12 PROCEDURE — 85025 COMPLETE CBC W/AUTO DIFF WBC: CPT

## 2023-08-12 PROCEDURE — 83540 ASSAY OF IRON: CPT

## 2023-08-12 PROCEDURE — 36415 COLL VENOUS BLD VENIPUNCTURE: CPT

## 2023-08-14 ENCOUNTER — HOSPITAL ENCOUNTER (OUTPATIENT)
Facility: HOSPITAL | Age: 28
Setting detail: RECURRING SERIES
Discharge: HOME | End: 2023-08-17
Payer: COMMERCIAL

## 2023-08-14 ENCOUNTER — TELEPHONE (OUTPATIENT)
Age: 28
End: 2023-08-14

## 2023-08-14 LAB
HCV AB SER IA-ACNC: 0.06 INDEX
HCV AB SERPL QL IA: NEGATIVE
HEPATITIS C COMMENT: NORMAL

## 2023-08-14 PROCEDURE — 97530 THERAPEUTIC ACTIVITIES: CPT

## 2023-08-14 PROCEDURE — 97112 NEUROMUSCULAR REEDUCATION: CPT

## 2023-08-14 PROCEDURE — 97110 THERAPEUTIC EXERCISES: CPT

## 2023-08-14 NOTE — PROGRESS NOTES
PHYSICAL / OCCUPATIONAL THERAPY - DAILY TREATMENT NOTE (updated )    Patient Name: Claudy Mccracken    Date: 2023    : 1995  Insurance: Payor: Chele Patel / Plan: Chele Patel [place]PO [place] LOCAL / Product Type: *No Product type* /      Patient  verified Yes     Visit #   Current / Total 5 8   Time   In / Out 1:09 1:50   Pain   In / Out 0/10 0/10   Subjective Functional Status/Changes: \"My knee is doing better. I'm going back to work on Friday. \"   Changes to: Allergies, Med Hx, Sx Hx?   no       TREATMENT AREA =  Pain in right knee [M25.561]    OBJECTIVE  Therapeutic Procedures: Tx Min Billable or 1:1 Min (if diff from Tx Min) Procedure, Rationale, Specifics   14  17490 Therapeutic Exercise (timed):  increase ROM, strength, coordination, balance, and proprioception to improve patient's ability to progress to PLOF and address remaining functional goals. (see flow sheet as applicable)     Details if applicable:       15  58362 Neuromuscular Re-Education (timed):  improve balance, coordination, kinesthetic sense, posture, core stability and proprioception to improve patient's ability to develop conscious control of individual muscles and awareness of position of extremities in order to progress to PLOF and address remaining functional goals. (see flow sheet as applicable)     Details if applicable:     12  84285 Therapeutic Activity (timed):  use of dynamic activities replicating functional movements to increase ROM, strength, coordination, balance, and proprioception in order to improve patient's ability to progress to PLOF and address remaining functional goals.   (see flow sheet as applicable)     Details if applicable:     39  [facility] Totals Reminder: bill using total billable min of TIMED therapeutic procedures (example: do not include dry needle or estim unattended, both untimed codes, in totals to left)  8-22 min = 1 unit; 23-37 min = 2 units; 38-52 min = 3 units; 53-67 min = 4 units; 68-82 min = 5
DIARRHEA

## 2023-08-15 ENCOUNTER — HOSPITAL ENCOUNTER (OUTPATIENT)
Facility: HOSPITAL | Age: 28
Discharge: HOME | End: 2023-08-18
Attending: SURGERY
Payer: COMMERCIAL

## 2023-08-15 DIAGNOSIS — E28.2 PCOS (POLYCYSTIC OVARIAN SYNDROME): ICD-10-CM

## 2023-08-15 DIAGNOSIS — E11.65 UNCONTROLLED TYPE 2 DIABETES MELLITUS WITH HYPERGLYCEMIA (HCC): ICD-10-CM

## 2023-08-15 DIAGNOSIS — Z71.89 ENCOUNTER FOR PRE-BARIATRIC SURGERY COUNSELING AND EDUCATION: ICD-10-CM

## 2023-08-15 DIAGNOSIS — E66.01 OBESITY, MORBID (HCC): ICD-10-CM

## 2023-08-15 DIAGNOSIS — G47.30 SLEEP APNEA, UNSPECIFIED TYPE: ICD-10-CM

## 2023-08-15 DIAGNOSIS — K21.9 GASTROESOPHAGEAL REFLUX DISEASE, UNSPECIFIED WHETHER ESOPHAGITIS PRESENT: ICD-10-CM

## 2023-08-15 PROCEDURE — 2500000003 HC RX 250 WO HCPCS: Performed by: SURGERY

## 2023-08-15 PROCEDURE — 6370000000 HC RX 637 (ALT 250 FOR IP): Performed by: SURGERY

## 2023-08-15 PROCEDURE — 74246 X-RAY XM UPR GI TRC 2CNTRST: CPT

## 2023-08-15 RX ADMIN — BARIUM SULFATE 340 G: 980 POWDER, FOR SUSPENSION ORAL at 09:52

## 2023-08-15 RX ADMIN — ANTACID/ANTIFLATULENT 1 EACH: 380; 550; 10; 10 GRANULE, EFFERVESCENT ORAL at 09:51

## 2023-08-15 RX ADMIN — BARIUM SULFATE 40 G: 960 POWDER, FOR SUSPENSION ORAL at 09:51

## 2023-08-16 ENCOUNTER — HOSPITAL ENCOUNTER (OUTPATIENT)
Facility: HOSPITAL | Age: 28
Setting detail: RECURRING SERIES
Discharge: HOME | End: 2023-08-19
Payer: COMMERCIAL

## 2023-08-16 ENCOUNTER — CLINICAL DOCUMENTATION (OUTPATIENT)
Facility: HOSPITAL | Age: 28
End: 2023-08-16

## 2023-08-16 ENCOUNTER — HOSPITAL ENCOUNTER (OUTPATIENT)
Facility: HOSPITAL | Age: 28
Discharge: HOME | End: 2023-08-19

## 2023-08-16 PROCEDURE — 97110 THERAPEUTIC EXERCISES: CPT

## 2023-08-16 PROCEDURE — 97112 NEUROMUSCULAR REEDUCATION: CPT

## 2023-08-16 PROCEDURE — 97530 THERAPEUTIC ACTIVITIES: CPT

## 2023-08-16 NOTE — PROGRESS NOTES
PHYSICAL / OCCUPATIONAL THERAPY - DAILY TREATMENT NOTE (updated )    Patient Name: Merry Waggoner    Date: 2023    : 1995  Insurance: Payor: Angela Rose / Plan: Angela Rose [place]PO [place] LOCAL / Product Type: *No Product type* /      Patient  verified Yes     Visit #   Current / Total 6 8   Time   In / Out 11:05 11:45   Pain   In / Out 0/10 0/10   Subjective Functional Status/Changes: The patient reports continued good improvement of right knee   Changes to: Allergies, Med Hx, Sx Hx?   no       TREATMENT AREA =  Pain in right knee [M25.561]    OBJECTIVE  Therapeutic Procedures: Tx Min Billable or 1:1 Min (if diff from Tx Min) Procedure, Rationale, Specifics   20  04571 Therapeutic Exercise (timed):  increase ROM, strength, coordination, balance, and proprioception to improve patient's ability to progress to PLOF and address remaining functional goals. (see flow sheet as applicable)     Details if applicable:       10  04285 Neuromuscular Re-Education (timed):  improve balance, coordination, kinesthetic sense, posture, core stability and proprioception to improve patient's ability to develop conscious control of individual muscles and awareness of position of extremities in order to progress to PLOF and address remaining functional goals. (see flow sheet as applicable)     Details if applicable:  dynamic step ups, lateral bandwalks, toe out SLR   10  48708 Therapeutic Activity (timed):  use of dynamic activities replicating functional movements to increase ROM, strength, coordination, balance, and proprioception in order to improve patient's ability to progress to PLOF and address remaining functional goals.   (see flow sheet as applicable)     Details if applicable:   squats for improved ease of transfers   40  [facility] Totals Reminder: bill using total billable min of TIMED therapeutic procedures (example: do not include dry needle or estim unattended, both untimed codes, in totals to left)  8-

## 2023-08-16 NOTE — PROGRESS NOTES
[facility]  [address] [address] [place] [address] [address]  [facility]  [phone] fax    Physical Therapy Discharge Summary  Patient name: Emiliano Britt Start of Care: 2023   Referral source: Benji Roberts MD : 1995               Medical Diagnosis: Pain in right knee [M25.561]        Onset Date:2023               Treatment Diagnosis: M25.561  RIGHT KNEE PAIN                                      Prior Hospitalization: see medical history Provider#: [id number]   Medications: Verified on Patient Summary List      Comorbidities: BMI > 50, Diabetes  Prior Level of Function: The patient reports having no pain walking or performing chores prior to injury. Visits from Start of Care: 6    Missed Visits: 0    Reporting Period : 2023 to 2023    Goals/Measure of Progress:  Progress toward goals / Updated goals:  []  See Progress Note/Recertification  Short Term Goals: To be accomplished in 2 weeks  The patient will demonstrate independence and compliance of HEP to maximize therapeutic benefit. IE: issued HEP              Current: reports compliance 23  The patient will improve right knee flexion without pain at end range to improve ease of bed mobility. IE: pain of patella with knee flexion              Current: Met, no pain 2023     Long Term Goals: To be accomplished in 5 weeks  The patient will improve FOTO score to 63 to improve quality of life. IE: 39              Current: Met 99  The patient will demonstrate negative hamstring and gastroc flexibility testing to reduce stress of anterior knee during ambulation to allow for improved ease of ambulation. IE: positive gastroc/hamstring flexibility              Current: Met   The patient will improve right hip ABD/EXT strength to 5/5 MMT to maximize stability in stance.               IE: 4/5 MMT EXT, 4+/5 MMT ABD              Current: Met

## 2023-08-17 ENCOUNTER — APPOINTMENT (OUTPATIENT)
Facility: HOSPITAL | Age: 28
End: 2023-08-17
Payer: COMMERCIAL

## 2023-08-18 LAB — VIT B1 BLD-SCNC: 125.1 NMOL/L (ref 66.5–200)

## 2023-08-19 ENCOUNTER — APPOINTMENT (OUTPATIENT)
Facility: HOSPITAL | Age: 28
End: 2023-08-19
Payer: COMMERCIAL

## 2023-08-19 ENCOUNTER — HOSPITAL ENCOUNTER (EMERGENCY)
Facility: HOSPITAL | Age: 28
Discharge: HOME | End: 2023-08-20
Attending: STUDENT IN AN ORGANIZED HEALTH CARE EDUCATION/TRAINING PROGRAM
Payer: COMMERCIAL

## 2023-08-19 VITALS
OXYGEN SATURATION: 98 % | RESPIRATION RATE: 20 BRPM | HEIGHT: 64 IN | BODY MASS INDEX: 49.34 KG/M2 | DIASTOLIC BLOOD PRESSURE: 89 MMHG | HEART RATE: 83 BPM | SYSTOLIC BLOOD PRESSURE: 148 MMHG | WEIGHT: 289 LBS | TEMPERATURE: 98 F

## 2023-08-19 DIAGNOSIS — S93.402A SPRAIN OF LEFT ANKLE, UNSPECIFIED LIGAMENT, INITIAL ENCOUNTER: Primary | ICD-10-CM

## 2023-08-19 PROCEDURE — 73610 X-RAY EXAM OF ANKLE: CPT

## 2023-08-19 PROCEDURE — 99283 EMERGENCY DEPT VISIT LOW MDM: CPT

## 2023-08-19 ASSESSMENT — ENCOUNTER SYMPTOMS
SHORTNESS OF BREATH: 0
ABDOMINAL PAIN: 0
SORE THROAT: 0
DIARRHEA: 0
VOMITING: 0

## 2023-08-20 NOTE — ED NOTES
Received patient in triage for c/o left ankle pain. Pt stated that she was at work approx 2 hours when she was walking and felt her left ankle pop with a radiating pain up her left leg. No swelling noted in triage.       Sydni Bender RN  08/19/23 0729

## 2023-08-20 NOTE — ED NOTES
Pt d/cd to home awake, alert and in NAD. All questions answered.       Shelly Price, RN  08/20/23 4971

## 2023-08-20 NOTE — ED PROVIDER NOTES
EMERGENCY DEPARTMENT HISTORY AND PHYSICAL EXAM      Date: 8/19/2023  Patient Name: Bob Wylie    History of Presenting Illness     Chief Complaint   Patient presents with    Ankle Pain       History (Context): Bob Wylie is a 33 y.o. female with significant past medical history of DM, asthma, PCOS presents ambulatory the ED today. Pt reports intermittent aching left ankle pain after twisting her ankle twice at work today. Patient reports hearing a \"pop\". Patient ambulatory after event with increased pain. Denies numbness, tingling, radiating pain, weakness. Patient has not taken anything for symptoms. PCP: KIMBERLY Mccall NP    No current facility-administered medications for this encounter. Current Outpatient Medications   Medication Sig Dispense Refill    insulin NPH (HUMULIN N;NOVOLIN N) 100 UNIT/ML injection pen Inject 18 Units into the skin 3 times daily (before meals)      Lancets MISC For use with lancing device of choice.  (Patient not taking: Reported on 8/11/2023)      Lancet Devices (LANCING DEVICE) MISC For use to check blood glucose three times and day before meals, and before evening dose of insulin  Indications: DM2 (Patient not taking: Reported on 8/11/2023)      insulin regular (HUMULIN R;NOVOLIN R) 100 UNIT/ML injection Inject 18 Units into the skin 3 times daily (with meals) (Patient not taking: Reported on 8/11/2023)         Past History     Past Medical History:   Past Medical History:   Diagnosis Date    Asthma     Chest pain     Insulin dependent type 2 diabetes mellitus, uncontrolled     Last HbA1c: 10.8% (on 9/24/2021)    Obesity, Class III, BMI 40-49.9 (morbid obesity) ([facility])     BMI 48.06 on 3/23/2022    PCOS (polycystic ovarian syndrome)     SARS-CoV-2 positive 09/16/2021    Covid-19+ w/ Pneumonia    UTI (urinary tract infection)        Past Surgical History:  Past Surgical History:   Procedure Laterality Date    CHOLECYSTECTOMY      open    ORTHOPEDIC

## 2023-08-21 ENCOUNTER — APPOINTMENT (OUTPATIENT)
Facility: HOSPITAL | Age: 28
End: 2023-08-21
Payer: COMMERCIAL

## 2023-08-21 DIAGNOSIS — K21.9 GASTROESOPHAGEAL REFLUX DISEASE, UNSPECIFIED WHETHER ESOPHAGITIS PRESENT: Primary | ICD-10-CM

## 2023-08-21 DIAGNOSIS — R93.3 ABNORMAL UGI SERIES: ICD-10-CM

## 2023-08-21 DIAGNOSIS — E11.65 UNCONTROLLED TYPE 2 DIABETES MELLITUS WITH HYPERGLYCEMIA (HCC): ICD-10-CM

## 2023-08-22 ENCOUNTER — HOSPITAL ENCOUNTER (OUTPATIENT)
Facility: HOSPITAL | Age: 28
Discharge: HOME | End: 2023-08-24
Payer: COMMERCIAL

## 2023-08-22 DIAGNOSIS — E11.65 UNCONTROLLED TYPE 2 DIABETES MELLITUS WITH HYPERGLYCEMIA (HCC): ICD-10-CM

## 2023-08-22 DIAGNOSIS — R09.89 WEAK PULSE: ICD-10-CM

## 2023-08-22 LAB
VAS LEFT ABI: 1.16
VAS LEFT ARM BP: 133 MMHG
VAS LEFT DORSALIS PEDIS BP: 134 MMHG
VAS LEFT PTA BP: 160 MMHG
VAS LEFT TBI: 0.97
VAS LEFT TOE PRESSURE: 134 MMHG
VAS RIGHT ABI: 1.08
VAS RIGHT ARM BP: 138 MMHG
VAS RIGHT DORSALIS PEDIS BP: 134 MMHG
VAS RIGHT PTA BP: 149 MMHG
VAS RIGHT TBI: 1.09
VAS RIGHT TOE PRESSURE: 150 MMHG

## 2023-08-22 PROCEDURE — 93922 UPR/L XTREMITY ART 2 LEVELS: CPT

## 2023-08-22 PROCEDURE — 93922 UPR/L XTREMITY ART 2 LEVELS: CPT | Performed by: INTERNAL MEDICINE

## 2023-08-24 ENCOUNTER — TELEPHONE (OUTPATIENT)
Age: 28
End: 2023-08-24

## 2023-08-24 DIAGNOSIS — E28.2 PCOS (POLYCYSTIC OVARIAN SYNDROME): ICD-10-CM

## 2023-08-24 DIAGNOSIS — E11.65 UNCONTROLLED TYPE 2 DIABETES MELLITUS WITH HYPERGLYCEMIA (HCC): Primary | ICD-10-CM

## 2023-08-24 DIAGNOSIS — E78.2 MIXED HYPERLIPIDEMIA: ICD-10-CM

## 2023-08-24 DIAGNOSIS — E66.01 OBESITY, MORBID (HCC): ICD-10-CM

## 2023-08-24 RX ORDER — INSULIN GLARGINE 100 [IU]/ML
27 INJECTION, SOLUTION SUBCUTANEOUS NIGHTLY
Qty: 5 ADJUSTABLE DOSE PRE-FILLED PEN SYRINGE | Refills: 1 | Status: SHIPPED | OUTPATIENT
Start: 2023-08-24

## 2023-08-24 RX ORDER — ROSUVASTATIN CALCIUM 5 MG/1
5 TABLET, COATED ORAL NIGHTLY
Qty: 90 TABLET | Refills: 1 | Status: SHIPPED | OUTPATIENT
Start: 2023-08-24

## 2023-08-24 RX ORDER — INSULIN LISPRO 100 [IU]/ML
INJECTION, SOLUTION INTRAVENOUS; SUBCUTANEOUS
Qty: 3 ADJUSTABLE DOSE PRE-FILLED PEN SYRINGE | Refills: 1 | Status: SHIPPED | OUTPATIENT
Start: 2023-08-24 | End: 2023-08-25 | Stop reason: SDUPTHER

## 2023-08-24 NOTE — TELEPHONE ENCOUNTER
----- Message from Hardik Alcala MD sent at 8/21/2023  2:42 PM EDT -----  Please notify patient of vitamin D deficiency/insufficiency and that I sent a script to the pharmacy on file. Also, please ask if the patient has established with a new PCP and what the plan is regarding her diabetes management. Thank you!

## 2023-08-24 NOTE — TELEPHONE ENCOUNTER
Spoke to the patient and advised per Dr. Kayley Ayala. Patient stated understanding. The patient stated that she has established with Dr. Haritha Gill and she will is managing the diabetes.

## 2023-08-25 DIAGNOSIS — E66.01 OBESITY, MORBID (HCC): ICD-10-CM

## 2023-08-25 DIAGNOSIS — E11.65 UNCONTROLLED TYPE 2 DIABETES MELLITUS WITH HYPERGLYCEMIA (HCC): ICD-10-CM

## 2023-08-25 DIAGNOSIS — E28.2 PCOS (POLYCYSTIC OVARIAN SYNDROME): ICD-10-CM

## 2023-08-25 RX ORDER — INSULIN LISPRO 100 [IU]/ML
10 INJECTION, SOLUTION INTRAVENOUS; SUBCUTANEOUS
Qty: 3 ADJUSTABLE DOSE PRE-FILLED PEN SYRINGE | Refills: 1 | Status: SHIPPED | OUTPATIENT
Start: 2023-08-25

## 2023-08-28 ENCOUNTER — OFFICE VISIT (OUTPATIENT)
Age: 28
End: 2023-08-28
Payer: COMMERCIAL

## 2023-08-28 VITALS
DIASTOLIC BLOOD PRESSURE: 68 MMHG | WEIGHT: 287.8 LBS | HEIGHT: 64 IN | SYSTOLIC BLOOD PRESSURE: 124 MMHG | HEART RATE: 84 BPM | TEMPERATURE: 97.7 F | OXYGEN SATURATION: 96 % | BODY MASS INDEX: 49.13 KG/M2 | RESPIRATION RATE: 16 BRPM

## 2023-08-28 DIAGNOSIS — E28.2 PCOS (POLYCYSTIC OVARIAN SYNDROME): ICD-10-CM

## 2023-08-28 DIAGNOSIS — E66.01 MORBID OBESITY (HCC): Primary | ICD-10-CM

## 2023-08-28 DIAGNOSIS — Z71.89 ENCOUNTER FOR PRE-BARIATRIC SURGERY COUNSELING AND EDUCATION: ICD-10-CM

## 2023-08-28 DIAGNOSIS — E11.65 UNCONTROLLED TYPE 2 DIABETES MELLITUS WITH HYPERGLYCEMIA (HCC): ICD-10-CM

## 2023-08-28 PROCEDURE — 99213 OFFICE O/P EST LOW 20 MIN: CPT | Performed by: NURSE PRACTITIONER

## 2023-08-28 PROCEDURE — 3046F HEMOGLOBIN A1C LEVEL >9.0%: CPT | Performed by: NURSE PRACTITIONER

## 2023-08-29 ASSESSMENT — ENCOUNTER SYMPTOMS
CONSTIPATION: 0
NAUSEA: 0
COUGH: 0
DIARRHEA: 0
VOMITING: 0
SHORTNESS OF BREATH: 0
ABDOMINAL PAIN: 0

## 2023-08-30 DIAGNOSIS — E66.01 OBESITY, MORBID (HCC): ICD-10-CM

## 2023-08-30 DIAGNOSIS — E28.2 PCOS (POLYCYSTIC OVARIAN SYNDROME): ICD-10-CM

## 2023-08-30 DIAGNOSIS — E11.65 UNCONTROLLED TYPE 2 DIABETES MELLITUS WITH HYPERGLYCEMIA (HCC): ICD-10-CM

## 2023-08-30 RX ORDER — INSULIN LISPRO 100 [IU]/ML
INJECTION, SOLUTION INTRAVENOUS; SUBCUTANEOUS
Qty: 3 ADJUSTABLE DOSE PRE-FILLED PEN SYRINGE | Refills: 1 | Status: SHIPPED | OUTPATIENT
Start: 2023-08-30 | End: 2023-10-29

## 2023-09-01 ENCOUNTER — OFFICE VISIT (OUTPATIENT)
Age: 28
End: 2023-09-01
Payer: COMMERCIAL

## 2023-09-01 VITALS
HEIGHT: 64 IN | RESPIRATION RATE: 17 BRPM | DIASTOLIC BLOOD PRESSURE: 66 MMHG | WEIGHT: 286 LBS | BODY MASS INDEX: 48.83 KG/M2 | HEART RATE: 70 BPM | SYSTOLIC BLOOD PRESSURE: 137 MMHG | TEMPERATURE: 98.6 F | OXYGEN SATURATION: 98 %

## 2023-09-01 DIAGNOSIS — Z3A.08 8 WEEKS GESTATION OF PREGNANCY: ICD-10-CM

## 2023-09-01 DIAGNOSIS — E11.65 UNCONTROLLED TYPE 2 DIABETES MELLITUS WITH HYPERGLYCEMIA (HCC): Primary | ICD-10-CM

## 2023-09-01 PROCEDURE — 3046F HEMOGLOBIN A1C LEVEL >9.0%: CPT | Performed by: NURSE PRACTITIONER

## 2023-09-01 PROCEDURE — 99213 OFFICE O/P EST LOW 20 MIN: CPT | Performed by: NURSE PRACTITIONER

## 2023-09-01 ASSESSMENT — PATIENT HEALTH QUESTIONNAIRE - PHQ9
SUM OF ALL RESPONSES TO PHQ9 QUESTIONS 1 & 2: 0
2. FEELING DOWN, DEPRESSED OR HOPELESS: 0
SUM OF ALL RESPONSES TO PHQ QUESTIONS 1-9: 0
1. LITTLE INTEREST OR PLEASURE IN DOING THINGS: 0

## 2023-09-05 ENCOUNTER — CLINICAL DOCUMENTATION (OUTPATIENT)
Age: 28
End: 2023-09-05

## 2023-09-05 ASSESSMENT — ENCOUNTER SYMPTOMS
COUGH: 0
ABDOMINAL PAIN: 0
SHORTNESS OF BREATH: 0
VOMITING: 0
CHEST TIGHTNESS: 0
NAUSEA: 0

## 2023-09-10 SDOH — HEALTH STABILITY: PHYSICAL HEALTH: ON AVERAGE, HOW MANY MINUTES DO YOU ENGAGE IN EXERCISE AT THIS LEVEL?: 0 MIN

## 2023-09-10 SDOH — HEALTH STABILITY: PHYSICAL HEALTH: ON AVERAGE, HOW MANY DAYS PER WEEK DO YOU ENGAGE IN MODERATE TO STRENUOUS EXERCISE (LIKE A BRISK WALK)?: 0 DAYS

## 2023-09-10 ASSESSMENT — SOCIAL DETERMINANTS OF HEALTH (SDOH)
WITHIN THE LAST YEAR, HAVE TO BEEN RAPED OR FORCED TO HAVE ANY KIND OF SEXUAL ACTIVITY BY YOUR PARTNER OR EX-PARTNER?: NO
WITHIN THE LAST YEAR, HAVE YOU BEEN KICKED, HIT, SLAPPED, OR OTHERWISE PHYSICALLY HURT BY YOUR PARTNER OR EX-PARTNER?: NO
WITHIN THE LAST YEAR, HAVE YOU BEEN AFRAID OF YOUR PARTNER OR EX-PARTNER?: NO
WITHIN THE LAST YEAR, HAVE YOU BEEN HUMILIATED OR EMOTIONALLY ABUSED IN OTHER WAYS BY YOUR PARTNER OR EX-PARTNER?: NO

## 2023-09-13 ENCOUNTER — OFFICE VISIT (OUTPATIENT)
Age: 28
End: 2023-09-13
Payer: COMMERCIAL

## 2023-09-13 ENCOUNTER — CLINICAL DOCUMENTATION (OUTPATIENT)
Facility: HOSPITAL | Age: 28
End: 2023-09-13

## 2023-09-13 VITALS — TEMPERATURE: 97.5 F | BODY MASS INDEX: 48.83 KG/M2 | HEIGHT: 64 IN | WEIGHT: 286 LBS

## 2023-09-13 DIAGNOSIS — S93.492A SPRAIN OF ANTERIOR TALOFIBULAR LIGAMENT OF LEFT ANKLE, INITIAL ENCOUNTER: Primary | ICD-10-CM

## 2023-09-13 PROCEDURE — 99214 OFFICE O/P EST MOD 30 MIN: CPT | Performed by: SPECIALIST

## 2023-09-13 RX ORDER — PROGESTERONE 200 MG/1
200 CAPSULE ORAL DAILY
COMMUNITY
Start: 2023-08-30

## 2023-09-13 NOTE — PROGRESS NOTES
9/13/23:  Patient reached out to me and stated she will need to withdrawal from the program because she is pregnant. Her upcoming classes with me have been canceled.       Heri Issa MS RD

## 2023-09-18 ENCOUNTER — CLINICAL DOCUMENTATION (OUTPATIENT)
Age: 28
End: 2023-09-18

## 2023-09-18 NOTE — PROGRESS NOTES
Patient dropped off [facility] Provider Request Form and two Physician Statement forms to be completed at hs office and can be reached at [phone] when ready for pickup.

## 2023-09-25 ENCOUNTER — CLINICAL DOCUMENTATION (OUTPATIENT)
Age: 28
End: 2023-09-25

## 2023-11-18 ENCOUNTER — HOSPITAL ENCOUNTER (EMERGENCY)
Facility: HOSPITAL | Age: 28
Discharge: HOME | End: 2023-11-18
Attending: EMERGENCY MEDICINE
Payer: COMMERCIAL

## 2023-11-18 VITALS
BODY MASS INDEX: 50.02 KG/M2 | OXYGEN SATURATION: 98 % | RESPIRATION RATE: 18 BRPM | HEART RATE: 94 BPM | HEIGHT: 64 IN | TEMPERATURE: 98.7 F | WEIGHT: 293 LBS | DIASTOLIC BLOOD PRESSURE: 79 MMHG | SYSTOLIC BLOOD PRESSURE: 143 MMHG

## 2023-11-18 DIAGNOSIS — R60.0 BILATERAL LOWER EXTREMITY EDEMA: Primary | ICD-10-CM

## 2023-11-18 DIAGNOSIS — Z3A.16 16 WEEKS GESTATION OF PREGNANCY: ICD-10-CM

## 2023-11-18 DIAGNOSIS — N30.00 ACUTE CYSTITIS WITHOUT HEMATURIA: ICD-10-CM

## 2023-11-18 LAB
ALBUMIN SERPL-MCNC: 2.8 G/DL (ref 3.4–5)
ALBUMIN/GLOB SERPL: 0.7 (ref 0.8–1.7)
ALP SERPL-CCNC: 51 U/L (ref 45–117)
ALT SERPL-CCNC: 18 U/L (ref 13–56)
ANION GAP SERPL CALC-SCNC: 5 MMOL/L (ref 3–18)
APPEARANCE UR: ABNORMAL
AST SERPL-CCNC: 10 U/L (ref 10–38)
BACTERIA URNS QL MICRO: ABNORMAL /HPF
BASOPHILS # BLD: 0 K/UL (ref 0–0.1)
BASOPHILS NFR BLD: 0 % (ref 0–2)
BILIRUB SERPL-MCNC: 0.2 MG/DL (ref 0.2–1)
BILIRUB UR QL: NEGATIVE
BUN SERPL-MCNC: 10 MG/DL (ref 7–18)
BUN/CREAT SERPL: 19 (ref 12–20)
CALCIUM SERPL-MCNC: 8.5 MG/DL (ref 8.5–10.1)
CHLORIDE SERPL-SCNC: 110 MMOL/L (ref 100–111)
CO2 SERPL-SCNC: 25 MMOL/L (ref 21–32)
COLOR UR: YELLOW
CREAT SERPL-MCNC: 0.52 MG/DL (ref 0.6–1.3)
D DIMER PPP FEU-MCNC: <0.27 UG/ML(FEU)
DIFFERENTIAL METHOD BLD: ABNORMAL
EOSINOPHIL # BLD: 0.1 K/UL (ref 0–0.4)
EOSINOPHIL NFR BLD: 1 % (ref 0–5)
EPITH CASTS URNS QL MICRO: ABNORMAL /LPF (ref 0–5)
ERYTHROCYTE [DISTWIDTH] IN BLOOD BY AUTOMATED COUNT: 13.3 % (ref 11.6–14.5)
GLOBULIN SER CALC-MCNC: 3.8 G/DL (ref 2–4)
GLUCOSE SERPL-MCNC: 108 MG/DL (ref 74–99)
GLUCOSE UR STRIP.AUTO-MCNC: NEGATIVE MG/DL
HCT VFR BLD AUTO: 35.8 % (ref 35–45)
HGB BLD-MCNC: 11.9 G/DL (ref 12–16)
HGB UR QL STRIP: NEGATIVE
IMM GRANULOCYTES # BLD AUTO: 0 K/UL (ref 0–0.04)
IMM GRANULOCYTES NFR BLD AUTO: 0 % (ref 0–0.5)
KETONES UR QL STRIP.AUTO: NEGATIVE MG/DL
LEUKOCYTE ESTERASE UR QL STRIP.AUTO: ABNORMAL
LYMPHOCYTES # BLD: 1.5 K/UL (ref 0.9–3.6)
LYMPHOCYTES NFR BLD: 18 % (ref 21–52)
MCH RBC QN AUTO: 29.4 PG (ref 24–34)
MCHC RBC AUTO-ENTMCNC: 33.2 G/DL (ref 31–37)
MCV RBC AUTO: 88.4 FL (ref 78–100)
MONOCYTES # BLD: 0.6 K/UL (ref 0.05–1.2)
MONOCYTES NFR BLD: 7 % (ref 3–10)
NEUTS SEG # BLD: 6.2 K/UL (ref 1.8–8)
NEUTS SEG NFR BLD: 73 % (ref 40–73)
NITRITE UR QL STRIP.AUTO: NEGATIVE
NRBC # BLD: 0 K/UL (ref 0–0.01)
NRBC BLD-RTO: 0 PER 100 WBC
PH UR STRIP: 6.5 (ref 5–8)
PLATELET # BLD AUTO: 199 K/UL (ref 135–420)
PMV BLD AUTO: 11.9 FL (ref 9.2–11.8)
POTASSIUM SERPL-SCNC: 3.9 MMOL/L (ref 3.5–5.5)
PROT SERPL-MCNC: 6.6 G/DL (ref 6.4–8.2)
PROT UR STRIP-MCNC: NEGATIVE MG/DL
RBC # BLD AUTO: 4.05 M/UL (ref 4.2–5.3)
RBC #/AREA URNS HPF: ABNORMAL /HPF (ref 0–5)
SODIUM SERPL-SCNC: 140 MMOL/L (ref 136–145)
SP GR UR REFRACTOMETRY: 1.02 (ref 1–1.03)
UROBILINOGEN UR QL STRIP.AUTO: 1 EU/DL (ref 0.2–1)
WBC # BLD AUTO: 8.4 K/UL (ref 4.6–13.2)
WBC URNS QL MICRO: ABNORMAL /HPF (ref 0–4)

## 2023-11-18 PROCEDURE — 85025 COMPLETE CBC W/AUTO DIFF WBC: CPT

## 2023-11-18 PROCEDURE — 85379 FIBRIN DEGRADATION QUANT: CPT

## 2023-11-18 PROCEDURE — 81001 URINALYSIS AUTO W/SCOPE: CPT

## 2023-11-18 PROCEDURE — 80053 COMPREHEN METABOLIC PANEL: CPT

## 2023-11-18 PROCEDURE — 99283 EMERGENCY DEPT VISIT LOW MDM: CPT

## 2023-11-18 RX ORDER — CEPHALEXIN 500 MG/1
500 CAPSULE ORAL 4 TIMES DAILY
Qty: 28 CAPSULE | Refills: 0 | Status: SHIPPED | OUTPATIENT
Start: 2023-11-18 | End: 2023-11-25

## 2023-11-18 ASSESSMENT — PAIN - FUNCTIONAL ASSESSMENT
PAIN_FUNCTIONAL_ASSESSMENT: NONE - DENIES PAIN
PAIN_FUNCTIONAL_ASSESSMENT: 0-10

## 2023-11-18 ASSESSMENT — PAIN SCALES - GENERAL: PAINLEVEL_OUTOF10: 6

## 2023-11-18 NOTE — ED TRIAGE NOTES
Pt ambulatory to room c/o bilateral leg swelling and pain since last night. Pt reports pain started at work.  Pt states \"my Dr told me to come get evaluated for a possible blood clot\" Pt 16 weeks pregnant

## 2023-11-18 NOTE — ED PROVIDER NOTES
EMERGENCY DEPARTMENT HISTORY AND PHYSICAL EXAM      Patient Name: Sreekanth Waddell  MRN: [id number]  YOB: 1995  Provider: Otoniel Montoya MD  PCP: KIMBERLY Flores NP   Time/Date of evaluation: 1:09 PM EST on 11/18/23    History of Presenting Illness     Chief Complaint   Patient presents with    Leg Swelling       History Provided By: {History Source:01567::\"Patient\"}     History (Narative):    Sreekanth Waddell is a 33 y.o. female {PMHx:74311::\"with a PMHX of ***\"} who presents to the emergency department  {Arrival XORC:46990} C/O *** onset ***. ***        Past History     Past Medical History:  Past Medical History:   Diagnosis Date    Asthma     Chest pain     Insulin dependent type 2 diabetes mellitus, uncontrolled     Last HbA1c: 10.8% (on 9/24/2021)    Obesity, Class III, BMI 40-49.9 (morbid obesity) (HCC)     BMI 48.06 on 3/23/2022    PCOS (polycystic ovarian syndrome)     SARS-CoV-2 positive 09/16/2021    Covid-19+ w/ Pneumonia    UTI (urinary tract infection)        Past Surgical History:  Past Surgical History:   Procedure Laterality Date    CHOLECYSTECTOMY      open    ORTHOPEDIC SURGERY  7years old    186 stitches in arm (trauma), general anesthesia used    OTHER SURGICAL HISTORY      laceration right forearm 180 sutures    REMOVAL GALLBLADDER N/A 12/22/2016    Dr. Salvatore Curtis       Family History:  Family History   Problem Relation Age of Onset    Hypertension Paternal Grandfather     Diabetes Paternal Grandfather     Hypertension Paternal Grandmother     Heart Disease Maternal Grandfather     Kidney Disease Maternal Grandfather     COPD Maternal Grandmother     Glaucoma Maternal Grandmother     Osteoarthritis Mother         throughout her spine    Breast Cancer Maternal Aunt     Other Maternal Aunt         \"tumors in uterus that weighed 48 lbs\"    Cancer Maternal Aunt         thyroid       Social History:  Social History     Tobacco Use    Smoking status: Never     Passive exposure: Never mmol/L    CO2 25 21 - 32 mmol/L    Anion Gap 5 3.0 - 18 mmol/L    Glucose 108 (H) 74 - 99 mg/dL    BUN 10 7.0 - 18 MG/DL    Creatinine 0.52 (L) 0.6 - 1.3 MG/DL    Bun/Cre Ratio 19 12 - 20      Est, Glom Filt Rate >60 >60 ml/min/1.73m2    Calcium 8.5 8.5 - 10.1 MG/DL    Total Bilirubin PENDING MG/DL    ALT 18 13 - 56 U/L    AST 10 10 - 38 U/L    Alk Phosphatase PENDING U/L    Total Protein PENDING g/dL    Albumin 2.8 (L) 3.4 - 5.0 g/dL    Globulin PENDING g/dL    Albumin/Globulin Ratio PENDING         Radiologic Studies: ***  No orders to display       EKG interpretation: (Preliminary)  EKG read by Dr. Nina Wynn at ***     Procedures     Procedures    ED Course     1:09 PM EST ABBY Wynn MD) am the first provider for this patient. Initial assessment performed. I reviewed the vital signs, available nursing notes, past medical history, past surgical history, family history and social history. The patients presenting problems have been discussed, and they are in agreement with the care plan formulated and outlined with them. I have encouraged them to ask questions as they arise throughout their visit. Records Reviewed: {Records Reviewed:72889::\"Nursing Notes\"}    Is this patient to be included in the SEP-1 core measure? {Sep-1 Core Yes/No:918060}    MEDICATIONS ADMINISTERED IN THE ED:  Medications - No data to display         Medical Decision Making     CC/HPI Summary, DDx, ED Course, and Reassessment: ***    Disposition Considerations (tests considered but not done, Admit vs D/C, Shared Decision Making, Pt Expectation of Test or Tx.): ***       Critical Care Time:     ***    Hui Eastman MD    Diagnosis and Disposition     ABBY Wynn MD am the primary clinician of record. DIAGNOSIS:   1. Bilateral lower extremity edema    2. 16 weeks gestation of pregnancy    3.  Acute cystitis without hematuria        DISPOSITION        PATIENT REFERRED TO:  KIMBERLY Zabala - NP  [address] [facility]

## 2023-12-04 ENCOUNTER — HOSPITAL ENCOUNTER (EMERGENCY)
Facility: HOSPITAL | Age: 28
Discharge: HOME | End: 2023-12-04
Attending: EMERGENCY MEDICINE
Payer: COMMERCIAL

## 2023-12-04 ENCOUNTER — APPOINTMENT (OUTPATIENT)
Facility: HOSPITAL | Age: 28
End: 2023-12-04
Payer: COMMERCIAL

## 2023-12-04 VITALS
DIASTOLIC BLOOD PRESSURE: 76 MMHG | HEART RATE: 95 BPM | TEMPERATURE: 99.3 F | HEIGHT: 64 IN | WEIGHT: 293 LBS | RESPIRATION RATE: 18 BRPM | SYSTOLIC BLOOD PRESSURE: 141 MMHG | OXYGEN SATURATION: 100 % | BODY MASS INDEX: 50.02 KG/M2

## 2023-12-04 DIAGNOSIS — N30.01 ACUTE CYSTITIS WITH HEMATURIA: Primary | ICD-10-CM

## 2023-12-04 DIAGNOSIS — Z34.92 SECOND TRIMESTER PREGNANCY: ICD-10-CM

## 2023-12-04 LAB
AMORPH CRY URNS QL MICRO: ABNORMAL
APPEARANCE UR: ABNORMAL
BACTERIA URNS QL MICRO: ABNORMAL /HPF
BILIRUB UR QL: NEGATIVE
COLOR UR: YELLOW
EPITH CASTS URNS QL MICRO: ABNORMAL /LPF (ref 0–5)
GLUCOSE UR STRIP.AUTO-MCNC: 100 MG/DL
HGB UR QL STRIP: NEGATIVE
KETONES UR QL STRIP.AUTO: NEGATIVE MG/DL
LEUKOCYTE ESTERASE UR QL STRIP.AUTO: ABNORMAL
NITRITE UR QL STRIP.AUTO: NEGATIVE
PH UR STRIP: 7 (ref 5–8)
PROT UR STRIP-MCNC: NEGATIVE MG/DL
RBC #/AREA URNS HPF: NEGATIVE /HPF (ref 0–5)
SP GR UR REFRACTOMETRY: 1.02 (ref 1–1.03)
UROBILINOGEN UR QL STRIP.AUTO: 0.2 EU/DL (ref 0.2–1)
WBC URNS QL MICRO: ABNORMAL /HPF (ref 0–4)

## 2023-12-04 PROCEDURE — 99284 EMERGENCY DEPT VISIT MOD MDM: CPT

## 2023-12-04 PROCEDURE — 81001 URINALYSIS AUTO W/SCOPE: CPT

## 2023-12-04 PROCEDURE — 76815 OB US LIMITED FETUS(S): CPT

## 2023-12-04 RX ORDER — FOLIC ACID 1 MG/1
1 TABLET ORAL DAILY
COMMUNITY

## 2023-12-04 RX ORDER — ASPIRIN 81 MG/1
81 TABLET, CHEWABLE ORAL DAILY
COMMUNITY

## 2023-12-04 RX ORDER — CEPHALEXIN 500 MG/1
500 CAPSULE ORAL 2 TIMES DAILY
Qty: 14 CAPSULE | Refills: 0 | Status: SHIPPED | OUTPATIENT
Start: 2023-12-04 | End: 2023-12-11

## 2023-12-04 ASSESSMENT — PAIN - FUNCTIONAL ASSESSMENT: PAIN_FUNCTIONAL_ASSESSMENT: NONE - DENIES PAIN

## 2023-12-04 NOTE — ED PROVIDER NOTES
medications    Details   cephALEXin (KEFLEX) 500 MG capsule Take 1 capsule by mouth 2 times daily for 7 days, Disp-14 capsule, R-0Normal             Attestations:    Joanne Casillas DO    Please note that this dictation was completed with [employer], the computer voice recognition software. Quite often unanticipated grammatical, syntax, homophones, and other interpretive errors are inadvertently transcribed by the computer software. Please disregard these errors. Please excuse any errors that have escaped final proofreading. Thank you.          Joanne Casillas DO  12/05/23 1126

## 2023-12-05 NOTE — DISCHARGE INSTRUCTIONS
Thank you! Thank you for allowing me to care for you in the emergency department. It is my goal to provide you with excellent care. If you have not received excellent quality care, please ask to speak to the nurse manager. Please fill out the survey that will come to you by mail or email since we listen to your feedback! Below you will find a list of your tests from today's visit. Should you have any questions, please do not hesitate to call the emergency department. Labs  Recent Results (from the past 12 hour(s))   Urinalysis    Collection Time: 12/04/23  2:25 PM   Result Value Ref Range    Color, UA YELLOW      Appearance TURBID      Specific Gravity, UA 1.019 1.005 - 1.030      pH, Urine 7.0 5.0 - 8.0      Protein, UA Negative NEG mg/dL    Glucose,  (A) NEG mg/dL    Ketones, Urine Negative NEG mg/dL    Bilirubin Urine Negative NEG      Blood, Urine Negative NEG      Urobilinogen, Urine 0.2 0.2 - 1.0 EU/dL    Nitrite, Urine Negative NEG      Leukocyte Esterase, Urine MODERATE (A) NEG     Urinalysis, Micro    Collection Time: 12/04/23  2:25 PM   Result Value Ref Range    WBC, UA 4 to 10 0 - 4 /hpf    RBC, UA Negative 0 - 5 /hpf    Epithelial Cells UA 3+ 0 - 5 /lpf    BACTERIA, URINE 1+ (A) NEG /hpf    Amorphous Crystal 2+ (A) NEG       Radiologic Studies  US OB 1 OR MORE FETUS LIMITED   Final Result      There is a single living intrauterine gestation in the breech presentation whose   average ultrasound age is 19 weeks 5 days. Neither ovary demonstrated due to bowel gas shadowing. Technologist has noted that the exam was technically challenging due to patient   body habitus.        ------------------------------------------------------------------------------------------------------------  The exam and treatment you received in the Emergency Department were for an urgent problem and are not intended as complete care.  It is important that you follow-up with a doctor, nurse practitioner, or

## 2023-12-05 NOTE — ED NOTES
Discharge instructions reviewed with patient. Patient verbalized understanding. Patient advised to follow up as directed on discharge instructions. Patient denies questions, needs or concerns at this time. Patient verbalized understanding. No s/sx of distress noted.        Elizabeth Meyer RN  12/04/23 2048

## 2024-01-17 ENCOUNTER — HOSPITAL ENCOUNTER (EMERGENCY)
Facility: HOSPITAL | Age: 29
Discharge: HOME | End: 2024-01-17
Payer: COMMERCIAL

## 2024-01-17 VITALS
RESPIRATION RATE: 18 BRPM | DIASTOLIC BLOOD PRESSURE: 59 MMHG | HEART RATE: 88 BPM | WEIGHT: 293 LBS | TEMPERATURE: 98.6 F | HEIGHT: 64 IN | SYSTOLIC BLOOD PRESSURE: 129 MMHG | OXYGEN SATURATION: 99 % | BODY MASS INDEX: 50.02 KG/M2

## 2024-01-17 DIAGNOSIS — H93.11 TINNITUS, RIGHT: Primary | ICD-10-CM

## 2024-01-17 PROCEDURE — 99283 EMERGENCY DEPT VISIT LOW MDM: CPT

## 2024-01-17 RX ORDER — ECHINACEA PURPUREA EXTRACT 125 MG
1 TABLET ORAL PRN
Qty: 1 EACH | Refills: 3 | Status: SHIPPED | OUTPATIENT
Start: 2024-01-17

## 2024-01-17 RX ORDER — FLUTICASONE PROPIONATE 50 MCG
1 SPRAY, SUSPENSION (ML) NASAL DAILY
Qty: 32 G | Refills: 1 | Status: SHIPPED | OUTPATIENT
Start: 2024-01-17

## 2024-01-17 RX ORDER — CETIRIZINE HYDROCHLORIDE 10 MG/1
10 TABLET ORAL DAILY
Qty: 30 TABLET | Refills: 0 | Status: SHIPPED | OUTPATIENT
Start: 2024-01-17 | End: 2024-02-16

## 2024-01-17 ASSESSMENT — ENCOUNTER SYMPTOMS
VOMITING: 0
SHORTNESS OF BREATH: 0
COUGH: 0
SINUS PRESSURE: 0
SINUS PAIN: 0
VOICE CHANGE: 0
EYE DISCHARGE: 0
PHOTOPHOBIA: 0
EYE REDNESS: 0
ABDOMINAL PAIN: 0
EYE PAIN: 0
TROUBLE SWALLOWING: 0
DIARRHEA: 0
CHEST TIGHTNESS: 0
EYE ITCHING: 0
SORE THROAT: 0
NAUSEA: 0
RHINORRHEA: 0
FACIAL SWELLING: 0

## 2024-01-17 ASSESSMENT — LIFESTYLE VARIABLES
HOW MANY STANDARD DRINKS CONTAINING ALCOHOL DO YOU HAVE ON A TYPICAL DAY: PATIENT DOES NOT DRINK
HOW OFTEN DO YOU HAVE A DRINK CONTAINING ALCOHOL: NEVER

## 2024-01-17 ASSESSMENT — PAIN - FUNCTIONAL ASSESSMENT: PAIN_FUNCTIONAL_ASSESSMENT: NONE - DENIES PAIN

## 2024-01-17 ASSESSMENT — PAIN SCALES - GENERAL: PAINLEVEL_OUTOF10: 0

## 2024-01-17 NOTE — ED PROVIDER NOTES
EMERGENCY DEPARTMENT HISTORY AND PHYSICAL EXAM        Date: 1/17/2024  Patient Name: Jessenia Clay    History of Presenting Illness     Chief Complaint   Patient presents with    Otalgia       History Provided By: History obtained from patient    HPI: Jessenia Clay, 28 y.o. female presents to the ED with cc of right ear ringing and muffled sound for 2 days    Patient denies any ear pain or ear discharge.  She denies any nasal congestion or rhinitis.  No sinus pressure or pain.  No injury associated with onset of right ear ringing.  States she has never experienced tinnitus before and wanted to be evaluated due to concern for infection and to get prognosis of tinnitus.    Patient is 25 weeks pregnant, experiencing frequent fetal movements, under the care of OB.    No nausea, vomiting, diarrhea, fever, chills, chest pain, shortness of breath, leg swelling    There are no other complaints, changes, or physical findings at this time.    Records Reviewed: none    PCP: Bethany Barone, KIMBERLY - NP    No current facility-administered medications on file prior to encounter.     Current Outpatient Medications on File Prior to Encounter   Medication Sig Dispense Refill    Prenatal Vit-Fe Fumarate-FA (PRENATAL VITAMINS PO) Take by mouth      aspirin 81 MG chewable tablet Take 1 tablet by mouth daily      folic acid (FOLVITE) 1 MG tablet Take 1 tablet by mouth daily      progesterone (PROMETRIUM) 200 MG CAPS capsule Take 1 capsule by mouth daily      insulin lispro, 1 Unit Dial, (HUMALOG KWIKPEN) 100 UNIT/ML SOPN Inject 10 Units into the skin 3 times daily (before meals) for 30 days, THEN 10 Units 3 times daily (before meals). Follow [facility] insulin dosing as provided to you. 3 Adjustable Dose Pre-filled Pen Syringe 1    insulin glargine (BASAGLAR KWIKPEN) 100 UNIT/ML injection pen Inject 27 Units into the skin nightly (Patient not taking: Reported on 9/1/2023) 5 Adjustable Dose Pre-filled Pen Syringe 1    Insulin Pen Needle

## 2024-01-17 NOTE — DISCHARGE INSTRUCTIONS
Nasal saline can help clear nasal/sinus congestion and has no pregnancy concerns    Do 7 day trial of Zyrtec and Flonase to see if symptoms improve. Follow up with primary care if persistent symptoms.

## 2024-01-17 NOTE — ED TRIAGE NOTES
Patient reports that her ear sounds to the right ear were muffled x3 days ago. Today she reports ringing in the ear. Patient is currently pregnant, MATTHIEU 04/28/2024.

## 2024-01-17 NOTE — ED NOTES
Condition stable.   Patient education was completed.  Patient education was taught to patient using discussion and handout.   Patient verbalized understanding.   Patient was discharged to home with family.  Patient was discharged ambulatory.

## 2024-02-12 PROBLEM — O26.893 PELVIC PAIN IN PREGNANCY, ANTEPARTUM, THIRD TRIMESTER: Status: ACTIVE | Noted: 2024-02-12

## 2024-02-12 PROBLEM — R10.2 PELVIC PAIN IN PREGNANCY, ANTEPARTUM, THIRD TRIMESTER: Status: ACTIVE | Noted: 2024-02-12

## 2024-02-25 ENCOUNTER — HOSPITAL ENCOUNTER (EMERGENCY)
Facility: HOSPITAL | Age: 29
Discharge: HOME | End: 2024-02-25
Attending: EMERGENCY MEDICINE
Payer: COMMERCIAL

## 2024-02-25 VITALS
RESPIRATION RATE: 18 BRPM | SYSTOLIC BLOOD PRESSURE: 153 MMHG | HEART RATE: 96 BPM | WEIGHT: 293 LBS | HEIGHT: 64 IN | TEMPERATURE: 97.7 F | DIASTOLIC BLOOD PRESSURE: 68 MMHG | BODY MASS INDEX: 50.02 KG/M2 | OXYGEN SATURATION: 98 %

## 2024-02-25 DIAGNOSIS — Z86.39 HISTORY OF INSULIN DEPENDENT DIABETES MELLITUS: ICD-10-CM

## 2024-02-25 DIAGNOSIS — J06.9 VIRAL URI WITH COUGH: Primary | ICD-10-CM

## 2024-02-25 DIAGNOSIS — J20.9 ACUTE BRONCHITIS, UNSPECIFIED ORGANISM: ICD-10-CM

## 2024-02-25 LAB
FLUAV AG NPH QL IA: NEGATIVE
FLUBV AG NOSE QL IA: NEGATIVE
SARS-COV-2 RDRP RESP QL NAA+PROBE: NOT DETECTED
SOURCE: NORMAL

## 2024-02-25 PROCEDURE — 6370000000 HC RX 637 (ALT 250 FOR IP): Performed by: EMERGENCY MEDICINE

## 2024-02-25 PROCEDURE — 99283 EMERGENCY DEPT VISIT LOW MDM: CPT

## 2024-02-25 PROCEDURE — 87635 SARS-COV-2 COVID-19 AMP PRB: CPT

## 2024-02-25 PROCEDURE — 87804 INFLUENZA ASSAY W/OPTIC: CPT

## 2024-02-25 RX ORDER — GUAIFENESIN/DEXTROMETHORPHAN 100-10MG/5
5 SYRUP ORAL 3 TIMES DAILY PRN
Qty: 75 ML | Refills: 0 | Status: SHIPPED | OUTPATIENT
Start: 2024-02-25 | End: 2024-03-01

## 2024-02-25 RX ORDER — ALBUTEROL SULFATE 90 UG/1
2 AEROSOL, METERED RESPIRATORY (INHALATION) 4 TIMES DAILY PRN
Qty: 54 G | Refills: 1 | Status: SHIPPED | OUTPATIENT
Start: 2024-02-25

## 2024-02-25 RX ORDER — GUAIFENESIN/DEXTROMETHORPHAN 100-10MG/5
5 SYRUP ORAL
Status: COMPLETED | OUTPATIENT
Start: 2024-02-25 | End: 2024-02-25

## 2024-02-25 RX ADMIN — GUAIFENESIN AND DEXTROMETHORPHAN 5 ML: 100; 10 SYRUP ORAL at 10:11

## 2024-02-25 ASSESSMENT — LIFESTYLE VARIABLES
HOW OFTEN DO YOU HAVE A DRINK CONTAINING ALCOHOL: NEVER
HOW MANY STANDARD DRINKS CONTAINING ALCOHOL DO YOU HAVE ON A TYPICAL DAY: PATIENT DOES NOT DRINK

## 2024-02-25 ASSESSMENT — PAIN - FUNCTIONAL ASSESSMENT
PAIN_FUNCTIONAL_ASSESSMENT: 0-10
PAIN_FUNCTIONAL_ASSESSMENT: 0-10
PAIN_FUNCTIONAL_ASSESSMENT: NONE - DENIES PAIN

## 2024-02-25 ASSESSMENT — PAIN SCALES - GENERAL: PAINLEVEL_OUTOF10: 0

## 2024-02-25 NOTE — ED PROVIDER NOTES
See Admin Instructions Afternoon dose      !! insulin regular (HUMULIN R;NOVOLIN R) 100 UNIT/ML injection Inject 30 Units into the skin See Admin Instructions Dinner time dose      insulin detemir (LEVEMIR) 100 UNIT/ML injection vial Inject 40 Units into the skin nightly HS      fluticasone (FLONASE) 50 MCG/ACT nasal spray 1 spray by Each Nostril route daily  Qty: 32 g, Refills: 1      sodium chloride (OCEAN) 0.65 % nasal spray 1 spray by Nasal route as needed for Congestion  Qty: 1 each, Refills: 3      Prenatal Vit-Fe Fumarate-FA (PRENATAL VITAMINS PO) Take by mouth      aspirin 81 MG chewable tablet Take 1 tablet by mouth daily      folic acid (FOLVITE) 1 MG tablet Take 1 tablet by mouth daily      progesterone (PROMETRIUM) 200 MG CAPS capsule Take 1 capsule by mouth daily      Insulin Pen Needle 32G X 6 MM MISC Change needle with each dose injection  Qty: 1 each, Refills: 3    Associated Diagnoses: Uncontrolled type 2 diabetes mellitus with hyperglycemia (HCC); PCOS (polycystic ovarian syndrome); Obesity, morbid (HCC)      Cholecalciferol 1.25 MG (62300 UT) TABS Take 1 capsule by mouth every 7 days for 12 doses  Qty: 12 tablet, Refills: 0      Lancets MISC For use with lancing device of choice.      Lancet Devices (LANCING DEVICE) MISC For use to check blood glucose three times and day before meals, and before evening dose of insulin  Indications: DM2       !! - Potential duplicate medications found. Please discuss with provider.          DISCONTINUED MEDICATIONS:  Current Discharge Medication List          PATIENT REFERRED TO:  Follow Up with:  No follow-up provider specified.  _______________________________    Clinical Impression:   1. Viral URI with cough         Camron Mueller MD using Dragon dictation      _______________________________     Camron Mueller MD  02/28/24 [id number]

## 2024-02-28 DIAGNOSIS — K21.9 GASTROESOPHAGEAL REFLUX DISEASE, UNSPECIFIED WHETHER ESOPHAGITIS PRESENT: ICD-10-CM

## 2024-02-28 DIAGNOSIS — Z71.89 ENCOUNTER FOR PRE-BARIATRIC SURGERY COUNSELING AND EDUCATION: ICD-10-CM

## 2024-02-28 DIAGNOSIS — E11.65 UNCONTROLLED TYPE 2 DIABETES MELLITUS WITH HYPERGLYCEMIA (HCC): ICD-10-CM

## 2024-02-28 DIAGNOSIS — Z11.59 ENCOUNTER FOR HEPATITIS C SCREENING TEST FOR LOW RISK PATIENT: ICD-10-CM

## 2024-02-28 DIAGNOSIS — E66.01 OBESITY, MORBID (HCC): ICD-10-CM

## 2024-02-28 DIAGNOSIS — E28.2 PCOS (POLYCYSTIC OVARIAN SYNDROME): ICD-10-CM

## 2024-02-28 DIAGNOSIS — G47.30 SLEEP APNEA, UNSPECIFIED TYPE: ICD-10-CM

## 2024-02-28 PROCEDURE — 36415 COLL VENOUS BLD VENIPUNCTURE: CPT | Performed by: NURSE PRACTITIONER

## 2024-03-12 PROBLEM — O24.919 DIABETES IN UNDELIVERED PREGNANCY: Status: ACTIVE | Noted: 2024-03-12

## 2024-03-21 PROBLEM — O47.9 UTERINE CONTRACTIONS: Status: ACTIVE | Noted: 2024-03-21

## 2024-03-25 PROBLEM — O11.9 PRE-ECLAMPSIA ADDED TO PRE-EXISTING HYPERTENSION: Status: ACTIVE | Noted: 2024-03-25

## 2024-03-26 PROBLEM — O36.8390 NON-REASSURING ELECTRONIC FETAL MONITORING TRACING: Status: ACTIVE | Noted: 2024-03-26

## 2024-03-26 PROBLEM — Z3A.35 35 WEEKS GESTATION OF PREGNANCY: Status: ACTIVE | Noted: 2024-03-26

## 2024-03-27 PROBLEM — O99.019 ANEMIA AFFECTING PREGNANCY: Status: ACTIVE | Noted: 2024-03-27

## 2024-03-29 PROBLEM — O36.8390 NON-REASSURING ELECTRONIC FETAL MONITORING TRACING: Status: RESOLVED | Noted: 2024-03-26 | Resolved: 2024-03-29

## 2024-03-29 PROBLEM — O11.9 PRE-ECLAMPSIA ADDED TO PRE-EXISTING HYPERTENSION: Status: RESOLVED | Noted: 2024-03-25 | Resolved: 2024-03-29

## 2024-03-29 PROBLEM — Z3A.35 35 WEEKS GESTATION OF PREGNANCY: Status: RESOLVED | Noted: 2024-03-26 | Resolved: 2024-03-29

## 2024-03-29 PROBLEM — O24.919 DIABETES IN UNDELIVERED PREGNANCY: Status: RESOLVED | Noted: 2024-03-12 | Resolved: 2024-03-29

## 2024-03-29 PROBLEM — O26.893 PELVIC PAIN IN PREGNANCY, ANTEPARTUM, THIRD TRIMESTER: Status: RESOLVED | Noted: 2024-02-12 | Resolved: 2024-03-29

## 2024-03-29 PROBLEM — O99.019 ANEMIA AFFECTING PREGNANCY: Status: RESOLVED | Noted: 2024-03-27 | Resolved: 2024-03-29

## 2024-03-29 PROBLEM — R10.2 PELVIC PAIN IN PREGNANCY, ANTEPARTUM, THIRD TRIMESTER: Status: RESOLVED | Noted: 2024-02-12 | Resolved: 2024-03-29

## 2024-03-29 PROBLEM — O47.9 UTERINE CONTRACTIONS: Status: RESOLVED | Noted: 2024-03-21 | Resolved: 2024-03-29

## 2024-03-29 PROBLEM — Z34.90 EARLY STAGE OF PREGNANCY: Status: RESOLVED | Noted: 2021-09-16 | Resolved: 2024-03-29

## 2024-03-29 PROBLEM — Z34.90 PREGNANCY: Status: RESOLVED | Noted: 2021-09-16 | Resolved: 2024-03-29

## 2024-04-05 PROBLEM — L03.311 ABDOMINAL WALL CELLULITIS: Status: ACTIVE | Noted: 2024-04-05

## 2024-04-07 PROBLEM — J12.82 PNEUMONIA DUE TO COVID-19 VIRUS: Status: RESOLVED | Noted: 2021-09-16 | Resolved: 2024-04-07

## 2024-04-07 PROBLEM — U07.1 PNEUMONIA DUE TO COVID-19 VIRUS: Status: RESOLVED | Noted: 2021-09-16 | Resolved: 2024-04-07

## 2025-01-19 NOTE — ED TRIAGE NOTES
Possible lt hand injury at work last Saturday. . unknown:etiology. . rt hand appears slightly bruised. Elige Nicely and swelling noted. .. moves all digits. Eliart Nicely
,[email or web address]